# Patient Record
Sex: MALE | Race: BLACK OR AFRICAN AMERICAN | Employment: FULL TIME | URBAN - METROPOLITAN AREA
[De-identification: names, ages, dates, MRNs, and addresses within clinical notes are randomized per-mention and may not be internally consistent; named-entity substitution may affect disease eponyms.]

---

## 2017-01-12 ENCOUNTER — OFFICE VISIT (OUTPATIENT)
Dept: FAMILY MEDICINE CLINIC | Age: 57
End: 2017-01-12

## 2017-01-12 VITALS
TEMPERATURE: 97.9 F | DIASTOLIC BLOOD PRESSURE: 104 MMHG | SYSTOLIC BLOOD PRESSURE: 149 MMHG | HEART RATE: 86 BPM | WEIGHT: 192 LBS | BODY MASS INDEX: 30.13 KG/M2 | RESPIRATION RATE: 18 BRPM | OXYGEN SATURATION: 98 % | HEIGHT: 67 IN

## 2017-01-12 DIAGNOSIS — M54.42 CHRONIC BILATERAL LOW BACK PAIN WITH BILATERAL SCIATICA: Primary | ICD-10-CM

## 2017-01-12 DIAGNOSIS — N52.9 ERECTILE DYSFUNCTION, UNSPECIFIED ERECTILE DYSFUNCTION TYPE: ICD-10-CM

## 2017-01-12 DIAGNOSIS — M54.41 CHRONIC BILATERAL LOW BACK PAIN WITH BILATERAL SCIATICA: Primary | ICD-10-CM

## 2017-01-12 DIAGNOSIS — G89.29 CHRONIC NECK PAIN: ICD-10-CM

## 2017-01-12 DIAGNOSIS — G89.29 CHRONIC BILATERAL LOW BACK PAIN WITH BILATERAL SCIATICA: Primary | ICD-10-CM

## 2017-01-12 DIAGNOSIS — M54.2 CHRONIC NECK PAIN: ICD-10-CM

## 2017-01-12 RX ORDER — SILDENAFIL 50 MG/1
50 TABLET, FILM COATED ORAL AS NEEDED
Qty: 10 TAB | Refills: 0 | Status: SHIPPED | OUTPATIENT
Start: 2017-01-12

## 2017-01-12 RX ORDER — OXYCODONE AND ACETAMINOPHEN 5; 325 MG/1; MG/1
TABLET ORAL
Qty: 45 TAB | Refills: 0 | Status: SHIPPED | OUTPATIENT
Start: 2017-01-12 | End: 2017-02-10 | Stop reason: SDUPTHER

## 2017-01-12 NOTE — MR AVS SNAPSHOT
Visit Information Date & Time Provider Department Dept. Phone Encounter #  
 1/12/2017  3:30 PM Rani Peter, [address] [facility] [phone] [id number] Follow-up Instructions Return if symptoms worsen or fail to improve. Upcoming Health Maintenance Date Due DTaP/Tdap/Td series (1 - Tdap) 8/7/1983 INFLUENZA AGE 9 TO ADULT 8/1/2016 COLONOSCOPY 12/23/2025 Allergies as of 1/12/2017  Review Complete On: 1/12/2017 By: TAMMY Grant Severity Noted Reaction Type Reactions Codeine  05/09/2014    Nausea and Vomiting Current Immunizations  Never Reviewed No immunizations on file. Not reviewed this visit You Were Diagnosed With   
  
 Codes Comments Chronic bilateral low back pain with bilateral sciatica    -  Primary ICD-10-CM: M54.42, M54.41, G89.29 ICD-9-CM: 724.2, 724.3, 338.29 Chronic neck pain     ICD-10-CM: M54.2, G89.29 ICD-9-CM: 723.1, 338.29 Erectile dysfunction, unspecified erectile dysfunction type     ICD-10-CM: N52.9 ICD-9-CM: 607.84 Vitals BP Pulse Temp Resp Height(growth percentile) Weight(growth percentile) (!) 149/104 (BP 1 Location: Right arm, BP Patient Position: Sitting) 86 97.9 °F (36.6 °C) (Oral) 18 5' 7.01\" (1.702 m) 192 lb (87.1 kg) SpO2 BMI Smoking Status 98% 30.06 kg/m2 Former Smoker Vitals History BMI and BSA Data Body Mass Index Body Surface Area 30.06 kg/m 2 2.03 m 2 Preferred Pharmacy Pharmacy Name Phone [facility] [address]. [phone] Your Updated Medication List  
  
   
This list is accurate as of: 1/12/17  4:07 PM.  Always use your most recent med list.  
  
  
  
  
 cyclobenzaprine 10 mg tablet Commonly known as:  FLEXERIL Take 1 Tab by mouth nightly. oxyCODONE-acetaminophen 5-325 mg per tablet Commonly known as:  PERCOCET  
 Take one tab po Q4h/PRN  
  
 sildenafil citrate 50 mg tablet Commonly known as:  VIAGRA Take 1 Tab by mouth as needed. Prescriptions Printed Refills  
 oxyCODONE-acetaminophen (PERCOCET) 5-325 mg per tablet 0 Sig: Take one tab po Q4h/PRN Class: Print Prescriptions Sent to Pharmacy Refills  
 sildenafil citrate (VIAGRA) 50 mg tablet 0 Sig: Take 1 Tab by mouth as needed. Class: Normal  
 [facility] [address]  #: [phone] Route: Oral  
  
Follow-up Instructions Return if symptoms worsen or fail to improve. Patient Instructions Chronic Pain: Care Instructions Your Care Instructions Chronic pain is pain that lasts a long time (months or even years) and may or may not have a clear cause. It is different from acute pain, which usually does have a clear causelike an injury or illnessand gets better over time. Chronic pain: 
· Lasts over time but may vary from day to day. · Does not go away despite efforts to end it. · May disrupt your sleep and lead to fatigue. · May cause depression or anxiety. · May make your muscles tense, causing more pain. · Can disrupt your work, hobbies, home life, and relationships with friends and family. Chronic pain is a very real condition. It is not just in your head. Treatment can help and usually includes several methods used together, such as medicines, physical therapy, exercise, and other treatments. Learning how to relax and changing negative thought patterns can also help you cope. Chronic pain is complex. Taking an active role in your treatment will help you better manage your pain. Tell your doctor if you have trouble dealing with your pain. You may have to try several things before you find what works best for you. Follow-up care is a key part of your treatment and safety.  Be sure to make and go to all appointments, and call your doctor if you are having problems. Its also a good idea to know your test results and keep a list of the medicines you take. How can you care for yourself at home? · Pace yourself. Break up large jobs into smaller tasks. Save harder tasks for days when you have less pain, or go back and forth between hard tasks and easier ones. Take rest breaks. · Relax, and reduce stress. Relaxation techniques such as deep breathing or meditation can help. · Keep moving. Gentle, daily exercise can help reduce pain over the long run. Try low- or no-impact exercises such as walking, swimming, and stationary biking. Do stretches to stay flexible. · Try heat, cold packs, and massage. · Get enough sleep. Chronic pain can make you tired and drain your energy. Talk with your doctor if you have trouble sleeping because of pain. · Think positive. Your thoughts can affect your pain level. Do things that you enjoy to distract yourself when you have pain instead of focusing on the pain. See a movie, read a book, listen to music, or spend time with a friend. · If you think you are depressed, talk to your doctor about treatment. · Keep a daily pain diary. Record how your moods, thoughts, sleep patterns, activities, and medicine affect your pain. You may find that your pain is worse during or after certain activities or when you are feeling a certain emotion. Having a record of your pain can help you and your doctor find the best ways to treat your pain. · Take pain medicines exactly as directed. ¨ If the doctor gave you a prescription medicine for pain, take it as prescribed. ¨ If you are not taking a prescription pain medicine, ask your doctor if you can take an over-the-counter medicine. Reducing constipation caused by pain medicine · Include fruits, vegetables, beans, and whole grains in your diet each day. These foods are high in fiber. · Drink plenty of fluids, enough so that your urine is light yellow or clear like water. If you have kidney, heart, or liver disease and have to limit fluids, talk with your doctor before you increase the amount of fluids you drink. · If your doctor recommends it, get more exercise. Walking is a good choice. Bit by bit, increase the amount you walk every day. Try for at least 30 minutes on most days of the week. · Schedule time each day for a bowel movement. A daily routine may help. Take your time and do not strain when having a bowel movement. When should you call for help? Call your doctor now or seek immediate medical care if: 
· Your pain gets worse or is out of control. · You feel down or blue, or you do not enjoy things like you once did. You may be depressed, which is common in people with chronic pain. Depression can be treated. · You have vomiting or cramps for more than 2 hours. Watch closely for changes in your health, and be sure to contact your doctor if: 
· You cannot sleep because of pain. · You are very worried or anxious about your pain. · You have trouble taking your pain medicine. · You have any concerns about your pain medicine. · You have trouble with bowel movements, such as: 
¨ No bowel movement in 3 days. ¨ Blood in the anal area, in your stool, or on the toilet paper. ¨ Diarrhea for more than 24 hours. Where can you learn more? Go to http://nereyda-ladan.info/. Enter N004 in the search box to learn more about \"Chronic Pain: Care Instructions. \" Current as of: February 19, 2016 Content Version: 11.1 © 2602-4300 [employer]. Care instructions adapted under license by [employer] (which disclaims liability or warranty for this information).  If you have questions about a medical condition or this instruction, always ask your healthcare professional. [address] any warranty or liability for your use of this information. Introducing [facility]! Veronica Infante introduces The [employer] patient portal. Now you can access parts of your medical record, email your doctor's office, and request medication refills online. 1. In your internet browser, go to [email or web address] 2. Click on the First Time User? Click Here link in the Sign In box. You will see the New Member Sign Up page. 3. Enter your The [employer] Access Code exactly as it appears below. You will not need to use this code after youve completed the sign-up process. If you do not sign up before the expiration date, you must request a new code. · The [employer] Access Code: [email or web address] Expires: 4/12/2017  4:06 PM 
 
4. Enter the last four digits of your Social Security Number (xxxx) and Date of Birth (mm/dd/yyyy) as indicated and click Submit. You will be taken to the next sign-up page. 5. Create a The [employer]ima ID. This will be your The [employer] login ID and cannot be changed, so think of one that is secure and easy to remember. 6. Create a [employer] password. You can change your password at any time. 7. Enter your Password Reset Question and Answer. This can be used at a later time if you forget your password. 8. Enter your e-mail address. You will receive e-mail notification when new information is available in [address] 9. Click Sign Up. You can now view and download portions of your medical record. 10. Click the Download Summary menu link to download a portable copy of your medical information. If you have questions, please visit the Frequently Asked Questions section of the [employer] website. Remember, The [employer]ima is NOT to be used for urgent needs. For medical emergencies, dial 911. Now available from your iPhone and Android! Please provide this summary of care documentation to your next provider. Your primary care clinician is listed as [address].  If you have any questions after today's visit, please call [phone].

## 2017-01-12 NOTE — PROGRESS NOTES
1. Have you been to the ER, urgent care clinic since your last visit? Hospitalized since your last visit?no    2. Have you seen or consulted any other health care providers outside of the [facility] [place] [facility]"" since your last visit? Include any pap smears or colon screening. No    Patient Sheldon Marcano is a 49 y.o. male presents today for med refill.

## 2017-01-12 NOTE — PROGRESS NOTES
HISTORY OF PRESENT ILLNESS  José Miguel Sena is a 49 y.o. male. HPI Comments: Pt comes back today c/o chronic low back pain and neck pain. He says that he tried to go to Ortho yesterday for his appt with Dr Adan Sahni but they told him that he needs to see a different MD bc they put him with the wrong docotr and so they rescheduled his appt to Feb 2. He says that he is still in a lot of pain with no changes. He says that he would like a refill of Viagra. He denies fever, chills, sweats, N/V, chest pain, SOB, dizziness. Review of Systems   Constitutional: Negative for chills, diaphoresis, fever and malaise/fatigue. HENT: Negative for congestion, ear pain, hearing loss, nosebleeds and sore throat. Eyes: Negative for blurred vision, double vision, pain and redness. Respiratory: Negative for cough, hemoptysis, sputum production, shortness of breath and wheezing. Cardiovascular: Negative for chest pain, palpitations and leg swelling. Gastrointestinal: Negative for abdominal pain, blood in stool, constipation, diarrhea, nausea and vomiting. Genitourinary: Negative for dysuria and hematuria. Musculoskeletal: Positive for back pain and neck pain. Negative for myalgias. Skin: Negative for rash. Neurological: Negative for dizziness, tingling, sensory change, seizures, loss of consciousness and headaches. Endo/Heme/Allergies: Does not bruise/bleed easily. Psychiatric/Behavioral: Negative for depression, memory loss and substance abuse. The patient is not nervous/anxious.       Past Medical History   Diagnosis Date    Chronic pain      neck, back    DDD (degenerative disc disease)     Erectile dysfunction 5/9/2014    Stenosis        Family History   Problem Relation Age of Onset    COPD Mother    [address] [facility] Cancer Father      brain met to whole body    No Known Problems Sister     No Known Problems Sister        Past Surgical History   Procedure Laterality Date    Pr neurological procedure unlisted  1998 laser back surgery    Hx orthopaedic       Laser sx for Lower back for a bulging disc       Social History     Social History    Marital status:      Spouse name: N/A    Number of children: N/A    Years of education: N/A     Occupational History    Not on file. Social History Main Topics    Smoking status: Former Smoker    Smokeless tobacco: Never Used    Alcohol use No    Drug use: No    Sexual activity: Yes     Partners: Female     Birth control/ protection: None     Other Topics Concern     Service No    Blood Transfusions No    Caffeine Concern No    Occupational Exposure No    Hobby Hazards No    Sleep Concern No    Stress Concern No    Weight Concern No    Special Diet No    Back Care No    Exercise Yes    Bike Helmet No    Seat Belt Yes    Self-Exams Yes     Social History Narrative       Current Outpatient Prescriptions   Medication Sig Dispense Refill    oxyCODONE-acetaminophen (PERCOCET) 5-325 mg per tablet Take one tab po Q4h/PRN 45 Tab 0    sildenafil citrate (VIAGRA) 50 mg tablet Take 1 Tab by mouth as needed. 10 Tab 0    cyclobenzaprine (FLEXERIL) 10 mg tablet Take 1 Tab by mouth nightly. 30 Tab 1       Allergies   Allergen Reactions    Codeine Nausea and Vomiting       Visit Vitals    BP (!) 149/104 (BP 1 Location: Right arm, BP Patient Position: Sitting)    Pulse 86    Temp 97.9 °F (36.6 °C) (Oral)    Resp 18    Ht 5' 7.01\" (1.702 m)    Wt 192 lb (87.1 kg)    SpO2 98%    BMI 30.06 kg/m2       Physical Exam   Constitutional: He is oriented to person, place, and time. He appears well-developed and well-nourished. No distress. HENT:   Head: Normocephalic and atraumatic. Right Ear: External ear normal.   Left Ear: External ear normal.   Nose: Nose normal.   Mouth/Throat: Oropharynx is clear and moist. No oropharyngeal exudate. Eyes: Conjunctivae are normal. Pupils are equal, round, and reactive to light. Right eye exhibits no discharge. Left eye exhibits no discharge. No scleral icterus. Neck: Normal range of motion. Neck supple. No tracheal deviation present. No thyromegaly present. Cardiovascular: Normal rate, regular rhythm and normal heart sounds. Exam reveals no gallop and no friction rub. No murmur heard. Pulmonary/Chest: Effort normal and breath sounds normal. No respiratory distress. He has no wheezes. He has no rales. Abdominal: Soft. He exhibits no distension. There is no tenderness. Musculoskeletal: He exhibits tenderness. He exhibits no edema. Neck and Low back: TTP, pain with ROM   Lymphadenopathy:     He has no cervical adenopathy. Neurological: He is alert and oriented to person, place, and time. Coordination normal.   Skin: Skin is warm and dry. No rash noted. He is not diaphoretic. No erythema. Psychiatric: He has a normal mood and affect. His behavior is normal. Judgment and thought content normal.       ASSESSMENT and PLAN  1. Chronic bilateral low back pain with bilateral sciatica  - oxyCODONE-acetaminophen (PERCOCET) 5-325 mg per tablet; Take one tab po Q4h/PRN  Dispense: 45 Tab; Refill: 0  - told pt that I do not do chronic pain management and if he does not see Ortho I will no longer give him any pain meds and will do a PM referral instead; pt declines PM referral and understands that I will not refill Percocet again until he sees Ortho  - NO MORE REFILLS OF PERCOCET UNTIL SEEN BY ORTHO    2. Chronic neck pain  - oxyCODONE-acetaminophen (PERCOCET) 5-325 mg per tablet; Take one tab po Q4h/PRN  Dispense: 45 Tab; Refill: 0    3. Erectile dysfunction, unspecified erectile dysfunction type  - sildenafil citrate (VIAGRA) 50 mg tablet; Take 1 Tab by mouth as needed. Dispense: 10 Tab; Refill: 0    - follow up as needed    Plan and reference materials were reviewed with the patient and the patient expressed understanding.   Patient instructed that if symptoms/condition worsens or fails to resolve to come back to the office or go to the emergency room.     Saulo Boo, [phone]

## 2017-02-10 ENCOUNTER — OFFICE VISIT (OUTPATIENT)
Dept: FAMILY MEDICINE CLINIC | Age: 57
End: 2017-02-10

## 2017-02-10 VITALS
OXYGEN SATURATION: 97 % | DIASTOLIC BLOOD PRESSURE: 100 MMHG | RESPIRATION RATE: 17 BRPM | WEIGHT: 196 LBS | HEIGHT: 67 IN | BODY MASS INDEX: 30.76 KG/M2 | TEMPERATURE: 96.8 F | SYSTOLIC BLOOD PRESSURE: 140 MMHG | HEART RATE: 97 BPM

## 2017-02-10 DIAGNOSIS — M54.42 CHRONIC BILATERAL LOW BACK PAIN WITH BILATERAL SCIATICA: Primary | ICD-10-CM

## 2017-02-10 DIAGNOSIS — G89.29 CHRONIC NECK PAIN: ICD-10-CM

## 2017-02-10 DIAGNOSIS — M54.41 CHRONIC BILATERAL LOW BACK PAIN WITH BILATERAL SCIATICA: Primary | ICD-10-CM

## 2017-02-10 DIAGNOSIS — G89.29 CHRONIC BILATERAL LOW BACK PAIN WITH BILATERAL SCIATICA: Primary | ICD-10-CM

## 2017-02-10 DIAGNOSIS — M54.2 CHRONIC NECK PAIN: ICD-10-CM

## 2017-02-10 RX ORDER — OXYCODONE AND ACETAMINOPHEN 5; 325 MG/1; MG/1
TABLET ORAL
Qty: 45 TAB | Refills: 0 | Status: SHIPPED | OUTPATIENT
Start: 2017-02-10 | End: 2018-08-18 | Stop reason: SDUPTHER

## 2017-02-10 NOTE — PROGRESS NOTES
Robyn Monroy is a 49 y.o. male presents to office for medication refill and reaction to tramadol. 1. Have you been to the ER, urgent care clinic or hospitalized since your last visit? no  2. [facility] since your last visit? ortho  3.  Have you had a Flu shot this year? no       Health Maintenance items with a due date reviewed with patient:  Health Maintenance Due   Topic Date Due    DTaP/Tdap/Td series (1 - Tdap) 08/07/1983    INFLUENZA AGE 9 TO ADULT  08/01/2016

## 2017-02-10 NOTE — PROGRESS NOTES
HISTORY OF PRESENT ILLNESS  José Miguel Wilder is a 49 y.o. male. HPI Comments: Pt comes in today for f/u chronic back pain. He says that he did see Ortho and he got a CT scan and they made him take 2-3 weeks off of work and he is scheduled for an MRI next week. He says that they gave him Tramadol but he took 1 tab and it made him very itchy all over. He brought the bottle of Tramadol with him to the appt today. He says that he would like the Percocet instead but Ortho said they would not give this to him and to return to PCP. He denies fever, chills, sweats, N/V, chest pain, SOB, dizziness. Medication Refill   Pertinent negatives include no chest pain, no abdominal pain, no headaches and no shortness of breath. Medication Reaction   Pertinent negatives include no chest pain, no abdominal pain, no headaches and no shortness of breath. Review of Systems   Constitutional: Negative for chills, diaphoresis, fever and malaise/fatigue. HENT: Negative for congestion, ear pain, hearing loss, nosebleeds and sore throat. Eyes: Negative for blurred vision, double vision, pain and redness. Respiratory: Negative for cough, hemoptysis, sputum production, shortness of breath and wheezing. Cardiovascular: Negative for chest pain, palpitations and leg swelling. Gastrointestinal: Negative for abdominal pain, blood in stool, constipation, diarrhea, nausea and vomiting. Genitourinary: Negative for dysuria and hematuria. Musculoskeletal: Positive for back pain. Negative for myalgias and neck pain. Skin: Negative for rash. Neurological: Negative for dizziness, tingling, sensory change, seizures, loss of consciousness and headaches. Endo/Heme/Allergies: Does not bruise/bleed easily. Psychiatric/Behavioral: Negative for depression, memory loss and substance abuse. The patient is not nervous/anxious.       Past Medical History   Diagnosis Date    Chronic pain      neck, back    DDD (degenerative disc disease)     Erectile dysfunction 5/9/2014    Stenosis        Family History   Problem Relation Age of Onset    COPD Mother    [facility] Cancer Father      brain met to whole body    No Known Problems Sister     No Known Problems Sister        Past Surgical History   Procedure Laterality Date    Pr neurological procedure unlisted  1998     laser back surgery    Hx orthopaedic       Laser sx for Lower back for a bulging disc       Social History     Social History    Marital status:      Spouse name: N/A    Number of children: N/A    Years of education: N/A     Occupational History    Not on file. Social History Main Topics    Smoking status: Former Smoker    Smokeless tobacco: Never Used    Alcohol use No    Drug use: No    Sexual activity: Yes     Partners: Female     Birth control/ protection: None     Other Topics Concern     Service No    Blood Transfusions No    Caffeine Concern No    Occupational Exposure No    Hobby Hazards No    Sleep Concern No    Stress Concern No    Weight Concern No    Special Diet No    Back Care No    Exercise Yes    Bike Helmet No    Seat Belt Yes    Self-Exams Yes     Social History Narrative       Current Outpatient Prescriptions   Medication Sig Dispense Refill    oxyCODONE-acetaminophen (PERCOCET) 5-325 mg per tablet Take one tab po Q4h/PRN 45 Tab 0    sildenafil citrate (VIAGRA) 50 mg tablet Take 1 Tab by mouth as needed. 10 Tab 0    cyclobenzaprine (FLEXERIL) 10 mg tablet Take 1 Tab by mouth nightly. 30 Tab 1       Allergies   Allergen Reactions    Codeine Nausea and Vomiting       Visit Vitals    BP (!) 140/100 (BP 1 Location: Right arm, BP Patient Position: Sitting)    Pulse 97    Temp 96.8 °F (36 °C) (Oral)    Resp 17    Ht 5' 7\" (1.702 m)    Wt 196 lb (88.9 kg)    SpO2 97%    BMI 30.7 kg/m2       Physical Exam   Constitutional: He is oriented to person, place, and time. He appears well-developed and well-nourished.  No distress. HENT:   Head: Normocephalic and atraumatic. Right Ear: External ear normal.   Left Ear: External ear normal.   Nose: Nose normal.   Mouth/Throat: Oropharynx is clear and moist. No oropharyngeal exudate. Eyes: Conjunctivae are normal. Pupils are equal, round, and reactive to light. Right eye exhibits no discharge. Left eye exhibits no discharge. No scleral icterus. Neck: Normal range of motion. Neck supple. No tracheal deviation present. No thyromegaly present. Cardiovascular: Normal rate, regular rhythm and normal heart sounds. Exam reveals no gallop and no friction rub. No murmur heard. Pulmonary/Chest: Effort normal and breath sounds normal. No respiratory distress. He has no wheezes. He has no rales. Abdominal: Soft. He exhibits no distension. There is no tenderness. Musculoskeletal: He exhibits tenderness. He exhibits no edema. Lymphadenopathy:     He has no cervical adenopathy. Neurological: He is alert and oriented to person, place, and time. Coordination normal.   Skin: Skin is warm and dry. No rash noted. He is not diaphoretic. No erythema. Psychiatric: He has a normal mood and affect. His behavior is normal. Judgment and thought content normal.       ASSESSMENT and PLAN  1. Chronic bilateral low back pain with bilateral sciatica  - oxyCODONE-acetaminophen (PERCOCET) 5-325 mg per tablet; Take one tab po Q4h/PRN  Dispense: 45 Tab; Refill: 0    2. Chronic neck pain  - oxyCODONE-acetaminophen (PERCOCET) 5-325 mg per tablet; Take one tab po Q4h/PRN  Dispense: 45 Tab; Refill: 0    - follow up as needed, followed by Ortho    Plan and reference materials were reviewed with the patient and the patient expressed understanding. Patient instructed that if symptoms/condition worsens or fails to resolve to come back to the office or go to the emergency room.     Nancy Scott

## 2017-02-10 NOTE — PATIENT INSTRUCTIONS
MRI of the Lumbar Spine: About This Test  What is it? MRI (magnetic resonance imaging) is a test that uses a magnetic field and pulses of radio wave energy to make pictures of the organs and structures inside the body. An MRI can give your doctor information about the spine in your lower back (the lumbar spine). This can include the spine, the space around the spinal cord, and the vertebrae in your lower back. When you have an MRI, you lie on a table that moves into the MRI machine. Why is this test done? An MRI of the lumbar spine can help find the cause of symptoms like back pain or leg pain, weakness, or numbness. It can help find problems such as a herniated disc, a tumor, or an infection. How can you prepare for the test?  Talk to your doctor about all your health conditions before the test. For example, tell your doctor if:  · You are allergic to any medicines. · You are or might be pregnant. · You have a pacemaker, an artificial limb, any metal pins or metal parts in your body, metal heart valves, metal clips in your brain, metal implants in your ears, or any other implanted or prosthetic medical device. · You have an intrauterine device (IUD) in place. · You get nervous in confined spaces. You may need medicine to help you relax. · You wear a patch that contains medicine. · You have kidney disease. What happens before the test?  · You will remove all metal objects, such as hearing aids, dentures, jewelry, watches, and hairpins. · You will take off all or most of your clothes and change into a gown. · You may have contrast material (dye) put into your arm through a tube called an IV. Contrast material helps doctors see specific organs, blood vessels, and most tumors. What happens during the test?  · You will lie on your back on a table that is part of the MRI scanner. Your head, chest, and arms may be held with straps to help you stay still.   · The table will slide into the space that contains the magnet. · Inside the scanner you will hear a fan and feel air moving. You may hear tapping, thumping, or snapping noises. You may be given earplugs or headphones to reduce the noise. · You will be asked to hold still during the scan. You may be asked to hold your breath for short periods. · You may be alone in the scanning room, but a technologist will watch you through a window and talk with you during the test.  What else should you know about the test?  · An MRI does not hurt. · If a dye is used, you may feel a quick sting or pinch and some coolness when the IV is started. Some people feel sick to their stomach or get a headache. · If you breastfeed and are concerned about whether the dye used in this test is safe, talk to your doctor. Most experts believe that very little dye passes into breast milk and even less is passed on to the baby. But if you prefer, you can store some of your breast milk ahead of time and use it for a day or two after the test.  · You may feel warmth in the area being examined. This is normal.  How long does the test take? · The test usually takes 30 to 60 minutes. What happens after the test?  · You will probably be able to go home right away, depending on the reason for the test.  · You can go back to your usual activities right away. Follow-up care is a key part of your treatment and safety. Be sure to make and go to all appointments, and call your doctor if you are having problems. It's also a good idea to keep a list of the medicines you take. Ask your doctor when you can expect to have your test results. Where can you learn more? Go to http://nereyda-ladan.info/. Enter A724 in the search box to learn more about \"MRI of the Lumbar Spine: About This Test.\"  Current as of: February 19, 2016  Content Version: 11.1  © 8474-3266 [employer].  Care instructions adapted under license by [employer] (which disclaims liability or warranty for this information). If you have questions about a medical condition or this instruction, always ask your healthcare professional. [place] [address] any warranty or liability for your use of this information.

## 2017-02-10 NOTE — MR AVS SNAPSHOT
Visit Information Date & Time Provider Department Dept. Phone Encounter #  
 2/10/2017  3:00 PM Tammy Partida, [address] [facility] [phone] [id number] Follow-up Instructions Return if symptoms worsen or fail to improve. Upcoming Health Maintenance Date Due DTaP/Tdap/Td series (1 - Tdap) 8/7/1983 INFLUENZA AGE 9 TO ADULT 8/1/2016 COLONOSCOPY 12/23/2025 Allergies as of 2/10/2017  Review Complete On: 2/10/2017 By: TAMMY Hoffmann Severity Noted Reaction Type Reactions Codeine  05/09/2014    Nausea and Vomiting Current Immunizations  Never Reviewed No immunizations on file. Not reviewed this visit You Were Diagnosed With   
  
 Codes Comments Chronic bilateral low back pain with bilateral sciatica    -  Primary ICD-10-CM: M54.42, M54.41, G89.29 ICD-9-CM: 724.2, 724.3, 338.29 Chronic neck pain     ICD-10-CM: M54.2, G89.29 ICD-9-CM: 723.1, 338.29 Vitals BP Pulse Temp Resp Height(growth percentile) Weight(growth percentile) (!) 140/100 (BP 1 Location: Right arm, BP Patient Position: Sitting) 97 96.8 °F (36 °C) (Oral) 17 5' 7\" (1.702 m) 196 lb (88.9 kg) SpO2 BMI Smoking Status 97% 30.7 kg/m2 Former Smoker BMI and BSA Data Body Mass Index Body Surface Area 30.7 kg/m 2 2.05 m 2 Preferred Pharmacy Pharmacy Name Phone [facility] [address] 16. [phone] Your Updated Medication List  
  
   
This list is accurate as of: 2/10/17  3:34 PM.  Always use your most recent med list.  
  
  
  
  
 cyclobenzaprine 10 mg tablet Commonly known as:  FLEXERIL Take 1 Tab by mouth nightly. oxyCODONE-acetaminophen 5-325 mg per tablet Commonly known as:  PERCOCET Take one tab po Q4h/PRN  
  
 sildenafil citrate 50 mg tablet Commonly known as:  VIAGRA Take 1 Tab by mouth as needed. Prescriptions Printed Refills  
 oxyCODONE-acetaminophen (PERCOCET) 5-325 mg per tablet 0 Sig: Take one tab po Q4h/PRN Class: Print Follow-up Instructions Return if symptoms worsen or fail to improve. Patient Instructions MRI of the Lumbar Spine: About This Test 
What is it? MRI (magnetic resonance imaging) is a test that uses a magnetic field and pulses of radio wave energy to make pictures of the organs and structures inside the body. An MRI can give your doctor information about the spine in your lower back (the lumbar spine). This can include the spine, the space around the spinal cord, and the vertebrae in your lower back. When you have an MRI, you lie on a table that moves into the MRI machine. Why is this test done? An MRI of the lumbar spine can help find the cause of symptoms like back pain or leg pain, weakness, or numbness. It can help find problems such as a herniated disc, a tumor, or an infection. How can you prepare for the test? 
Talk to your doctor about all your health conditions before the test. For example, tell your doctor if: 
· You are allergic to any medicines. · You are or might be pregnant. · You have a pacemaker, an artificial limb, any metal pins or metal parts in your body, metal heart valves, metal clips in your brain, metal implants in your ears, or any other implanted or prosthetic medical device. · You have an intrauterine device (IUD) in place. · You get nervous in confined spaces. You may need medicine to help you relax. · You wear a patch that contains medicine. · You have kidney disease. What happens before the test? 
· You will remove all metal objects, such as hearing aids, dentures, jewelry, watches, and hairpins. · You will take off all or most of your clothes and change into a gown.  
· You may have contrast material (dye) put into your arm through a tube called an IV. Contrast material helps doctors see specific organs, blood vessels, and most tumors. What happens during the test? 
· You will lie on your back on a table that is part of the MRI scanner. Your head, chest, and arms may be held with straps to help you stay still. · The table will slide into the space that contains the magnet. · Inside the scanner you will hear a fan and feel air moving. You may hear tapping, thumping, or snapping noises. You may be given earplugs or headphones to reduce the noise. · You will be asked to hold still during the scan. You may be asked to hold your breath for short periods. · You may be alone in the scanning room, but a technologist will watch you through a window and talk with you during the test. 
What else should you know about the test? 
· An MRI does not hurt. · If a dye is used, you may feel a quick sting or pinch and some coolness when the IV is started. Some people feel sick to their stomach or get a headache. · If you breastfeed and are concerned about whether the dye used in this test is safe, talk to your doctor. Most experts believe that very little dye passes into breast milk and even less is passed on to the baby. But if you prefer, you can store some of your breast milk ahead of time and use it for a day or two after the test. 
· You may feel warmth in the area being examined. This is normal. 
How long does the test take? · The test usually takes 30 to 60 minutes. What happens after the test? 
· You will probably be able to go home right away, depending on the reason for the test. 
· You can go back to your usual activities right away. Follow-up care is a key part of your treatment and safety. Be sure to make and go to all appointments, and call your doctor if you are having problems. It's also a good idea to keep a list of the medicines you take. Ask your doctor when you can expect to have your test results. Where can you learn more? Go to http://nereyda-ladan.info/. Enter A325 in the search box to learn more about \"MRI of the Lumbar Spine: About This Test.\" Current as of: February 19, 2016 Content Version: 11.1 © 2774-6074 [employer]. Care instructions adapted under license by [employer] (which disclaims liability or warranty for this information). If you have questions about a medical condition or this instruction, always ask your healthcare professional. [address] any warranty or liability for your use of this information. Introducing [address]! [facility] introduces Tappx patient portal. Now you can access parts of your medical record, email your doctor's office, and request medication refills online. 1. In your internet browser, go to [email or web address] 2. Click on the First Time User? Click Here link in the Sign In box. You will see the New Member Sign Up page. 3. Enter your Tappx Access Code exactly as it appears below. You will not need to use this code after youve completed the sign-up process. If you do not sign up before the expiration date, you must request a new code. · Tappx Access Code: [email or web address] Expires: 4/12/2017  4:06 PM 
 
4. Enter the last four digits of your Social Security Number (xxxx) and Date of Birth (mm/dd/yyyy) as indicated and click Submit. You will be taken to the next sign-up page. 5. Create a Tappx ID. This will be your Tappx login ID and cannot be changed, so think of one that is secure and easy to remember. 6. Create a Tappx password. You can change your password at any time. 7. Enter your Password Reset Question and Answer. This can be used at a later time if you forget your password. 8. Enter your e-mail address. You will receive e-mail notification when new information is available in [address] 9. Click Sign Up.  You can now view and download portions of your medical record. 10. Click the Download Summary menu link to download a portable copy of your medical information. If you have questions, please visit the Frequently Asked Questions section of the Shizzlr website. Remember, Shizzlr is NOT to be used for urgent needs. For medical emergencies, dial 911. Now available from your iPhone and Android! Please provide this summary of care documentation to your next provider. Your primary care clinician is listed as Mina Fernandes. If you have any questions after today's visit, please call [phone].

## 2017-03-11 ENCOUNTER — OFFICE VISIT (OUTPATIENT)
Dept: FAMILY MEDICINE CLINIC | Age: 57
End: 2017-03-11

## 2017-03-11 VITALS
SYSTOLIC BLOOD PRESSURE: 130 MMHG | HEIGHT: 67 IN | RESPIRATION RATE: 16 BRPM | OXYGEN SATURATION: 97 % | WEIGHT: 191 LBS | DIASTOLIC BLOOD PRESSURE: 86 MMHG | BODY MASS INDEX: 29.98 KG/M2 | HEART RATE: 75 BPM | TEMPERATURE: 96.4 F

## 2017-03-11 DIAGNOSIS — G89.29 CHRONIC NECK PAIN: Primary | ICD-10-CM

## 2017-03-11 DIAGNOSIS — G89.29 CHRONIC BILATERAL LOW BACK PAIN WITH BILATERAL SCIATICA: ICD-10-CM

## 2017-03-11 DIAGNOSIS — M54.41 CHRONIC BILATERAL LOW BACK PAIN WITH BILATERAL SCIATICA: ICD-10-CM

## 2017-03-11 DIAGNOSIS — M54.2 CHRONIC NECK PAIN: Primary | ICD-10-CM

## 2017-03-11 DIAGNOSIS — M54.42 CHRONIC BILATERAL LOW BACK PAIN WITH BILATERAL SCIATICA: ICD-10-CM

## 2017-03-11 NOTE — PROGRESS NOTES
Serena Edwards is a 49 y.o. male here today for back pain due to fall.  Patient stated that [facility] and was given medication and referred to PT

## 2017-03-11 NOTE — PROGRESS NOTES
HISTORY OF PRESENT ILLNESS  José Miguel Miner is a 49 y.o. male. HPI Comments: Pt comes in today c/o low back pain and neck pain. He says that he was at work and fell on a rock. He says that his work sent him to [facility] and they gave him Flexeril and Norco. He says that the [address] does not work and makes his stomach upset. He denies fever, chills, sweats, N/V, chest pain, SOB, dizziness. Back Pain    Pertinent negatives include no chest pain, no fever, no headaches, no abdominal pain, no dysuria and no tingling. Review of Systems   Constitutional: Negative for chills, diaphoresis, fever and malaise/fatigue. HENT: Negative for congestion, ear pain, hearing loss, nosebleeds and sore throat. Eyes: Negative for blurred vision, double vision, pain and redness. Respiratory: Negative for cough, hemoptysis, sputum production, shortness of breath and wheezing. Cardiovascular: Negative for chest pain, palpitations and leg swelling. Gastrointestinal: Negative for abdominal pain, blood in stool, constipation, diarrhea, nausea and vomiting. Genitourinary: Negative for dysuria and hematuria. Musculoskeletal: Positive for back pain and neck pain. Negative for myalgias. Skin: Negative for rash. Neurological: Negative for dizziness, tingling, sensory change, seizures, loss of consciousness and headaches. Endo/Heme/Allergies: Does not bruise/bleed easily. Psychiatric/Behavioral: Negative for depression, memory loss and substance abuse. The patient is not nervous/anxious.       Past Medical History:   Diagnosis Date    Chronic pain     neck, back    DDD (degenerative disc disease)     Erectile dysfunction 5/9/2014    Stenosis        Family History   Problem Relation Age of Onset    COPD Mother    [address] [facility] Mario Cancer Father      brain met to whole body    No Known Problems Sister     No Known Problems Sister        Past Surgical History:   Procedure Laterality Date    HX ORTHOPAEDIC      Laser sx for Lower back for a bulging disc    NEUROLOGICAL PROCEDURE UNLISTED  1998    laser back surgery       Social History     Social History    Marital status:      Spouse name: N/A    Number of children: N/A    Years of education: N/A     Occupational History    Not on file. Social History Main Topics    Smoking status: Former Smoker    Smokeless tobacco: Never Used    Alcohol use No    Drug use: No    Sexual activity: Yes     Partners: Female     Birth control/ protection: None     Other Topics Concern     Service No    Blood Transfusions No    Caffeine Concern No    Occupational Exposure No    Hobby Hazards No    Sleep Concern No    Stress Concern No    Weight Concern No    Special Diet No    Back Care No    Exercise Yes    Bike Helmet No    Seat Belt Yes    Self-Exams Yes     Social History Narrative       Current Outpatient Prescriptions   Medication Sig Dispense Refill    sildenafil citrate (VIAGRA) 50 mg tablet Take 1 Tab by mouth as needed. 10 Tab 0    cyclobenzaprine (FLEXERIL) 10 mg tablet Take 1 Tab by mouth nightly. 30 Tab 1    oxyCODONE-acetaminophen (PERCOCET) 5-325 mg per tablet Take one tab po Q4h/PRN 45 Tab 0       Allergies   Allergen Reactions    Codeine Nausea and Vomiting    Vicodin [Hydrocodone-Acetaminophen] Nausea Only       Visit Vitals    /86 (BP 1 Location: Right arm, BP Patient Position: Sitting)    Pulse 75    Temp 96.4 °F (35.8 °C) (Oral)    Resp 16    Ht 5' 7\" (1.702 m)    Wt 191 lb (86.6 kg)    SpO2 97%    BMI 29.91 kg/m2       Physical Exam   Constitutional: He is oriented to person, place, and time. He appears well-developed and well-nourished. No distress. HENT:   Head: Normocephalic and atraumatic. Right Ear: External ear normal.   Left Ear: External ear normal.   Nose: Nose normal.   Mouth/Throat: Oropharynx is clear and moist. No oropharyngeal exudate.    Eyes: Conjunctivae are normal. Pupils are equal, round, and reactive to light. Right eye exhibits no discharge. Left eye exhibits no discharge. No scleral icterus. Neck: Normal range of motion. Neck supple. No tracheal deviation present. No thyromegaly present. Cardiovascular: Normal rate, regular rhythm and normal heart sounds. Exam reveals no gallop and no friction rub. No murmur heard. Pulmonary/Chest: Effort normal and breath sounds normal. No respiratory distress. He has no wheezes. He has no rales. Abdominal: Soft. Bowel sounds are normal. He exhibits no distension. There is no tenderness. There is no rebound and no guarding. Musculoskeletal: He exhibits edema and tenderness. Neck and back: TTP, pain with ROM   Lymphadenopathy:     He has no cervical adenopathy. Neurological: He is alert and oriented to person, place, and time. Coordination normal.   Skin: Skin is warm and dry. No rash noted. He is not diaphoretic. No erythema. Psychiatric: He has a normal mood and affect. His behavior is normal. Judgment and thought content normal.       ASSESSMENT and PLAN  1. Chronic neck pain  - told pt I would not give pain meds and I could not interfere with workman's comp and he needs to follow up with Ortho; pt expressed understanding    2. Chronic bilateral low back pain with bilateral sciatica    Plan and reference materials were reviewed with the patient and the patient expressed understanding. Patient instructed that if symptoms/condition worsens or fails to resolve to come back to the office or go to the emergency room.     Nancy De La Rosa

## 2017-03-11 NOTE — MR AVS SNAPSHOT
Visit Information Date & Time Provider Department Dept. Phone Encounter #  
 3/11/2017  3:30 PM Nallely Friday, [address] [facility] [phone] [id number] Follow-up Instructions Return if symptoms worsen or fail to improve. Upcoming Health Maintenance Date Due DTaP/Tdap/Td series (1 - Tdap) 8/7/1983 INFLUENZA AGE 9 TO ADULT 8/1/2016 COLONOSCOPY 12/23/2025 Allergies as of 3/11/2017  Review Complete On: 3/11/2017 By: Yobani Becerril LPN Severity Noted Reaction Type Reactions Codeine  05/09/2014    Nausea and Vomiting Vicodin [Hydrocodone-acetaminophen]  03/11/2017    Nausea Only Current Immunizations  Never Reviewed No immunizations on file. Not reviewed this visit You Were Diagnosed With   
  
 Codes Comments Chronic neck pain    -  Primary ICD-10-CM: M54.2, G89.29 ICD-9-CM: 723.1, 338.29 Chronic bilateral low back pain with bilateral sciatica     ICD-10-CM: M54.42, M54.41, G89.29 ICD-9-CM: 724.2, 724.3, 338.29 Vitals BP Pulse Temp Resp Height(growth percentile) Weight(growth percentile) 130/86 (BP 1 Location: Right arm, BP Patient Position: Sitting) 75 96.4 °F (35.8 °C) (Oral) 16 5' 7\" (1.702 m) 191 lb (86.6 kg) SpO2 BMI Smoking Status 97% 29.91 kg/m2 Former Smoker Vitals History BMI and BSA Data Body Mass Index Body Surface Area  
 29.91 kg/m 2 2.02 m 2 Preferred Pharmacy Pharmacy Name Phone [facility] [address]. [phone] Your Updated Medication List  
  
   
This list is accurate as of: 3/11/17  3:51 PM.  Always use your most recent med list.  
  
  
  
  
 cyclobenzaprine 10 mg tablet Commonly known as:  FLEXERIL Take 1 Tab by mouth nightly. oxyCODONE-acetaminophen 5-325 mg per tablet Commonly known as:  PERCOCET Take one tab po Q4h/PRN  
  
 sildenafil citrate 50 mg tablet Commonly known as:  VIAGRA Take 1 Tab by mouth as needed. Follow-up Instructions Return if symptoms worsen or fail to improve. Patient Instructions Learning About How to Have a Healthy Back What causes back pain? Back pain is often caused by overuse, strain, or injury. For example, people often hurt their backs playing sports or working in the yard, being jolted in a car accident, or lifting something too heavy. Aging plays a part too. Your bones and muscles tend to lose strength as you age, which makes injury more likely. The spongy discs between the bones of the spine (vertebrae) may suffer from wear and tear and no longer provide enough cushion between the bones. A disc that bulges or breaks open (herniated disc) can press on nerves, causing back pain. In some people, back pain is the result of arthritis, broken vertebrae caused by bone loss (osteoporosis), illness, or a spine problem. Although most people have back pain at one time or another, there are steps you can take to make it less likely. How can you have a healthy back? Reduce stress on your back through good posture Slumping or slouching alone may not cause low back pain. But after the back has been strained or injured, bad posture can make pain worse. · Sleep in a position that maintains your back's normal curves and on a mattress that feels comfortable. Sleep on your side with a pillow between your knees, or sleep on your back with a pillow under your knees. These positions can reduce strain on your back. · Stand and sit up straight. \"Good posture\" generally means your ears, shoulders, and hips are in a straight line. · If you must stand for a long time, put one foot on a stool, ledge, or box. Switch feet every now and then.  
· Sit in a chair that is low enough to let you place both feet flat on the floor with both knees nearly level with your hips. If your chair or desk is too high, use a footrest to raise your knees. Place a small pillow, a rolled-up towel, or a lumbar roll in the curve of your back if you need extra support. · Try a kneeling chair, which helps tilt your hips forward. This takes pressure off your lower back. · Try sitting on an exercise ball. It can rock from side to side, which helps keep your back loose. · When driving, keep your knees nearly level with your hips. Sit straight, and drive with both hands on the steering wheel. Your arms should be in a slightly bent position. Reduce stress on your back through careful lifting · Squat down, bending at the hips and knees only. If you need to, put one knee to the floor and extend your other knee in front of you, bent at a right angle (half kneeling). · Press your chest straight forward. This helps keep your upper back straight while keeping a slight arch in your low back. · Hold the load as close to your body as possible, at the level of your belly button (navel). · Use your feet to change direction, taking small steps. · Lead with your hips as you change direction. Keep your shoulders in line with your hips as you move. · Set down your load carefully, squatting with your knees and hips only. Exercise and stretch your back · Do some exercise on most days of the week, if your doctor says it is okay. You can walk, run, swim, or cycle. · Stretch your back muscles. Here are a few exercises to try: ¨ Lie on your back, and gently pull one bent knee to your chest. Put that foot back on the floor, and then pull the other knee to your chest. 
¨ Do pelvic tilts. Lie on your back with your knees bent. Tighten your stomach muscles. Pull your belly button (navel) in and up toward your ribs. You should feel like your back is pressing to the floor and your hips and pelvis are slightly lifting off the floor.  Hold for 6 seconds while breathing smoothly. ¨ Sit with your back flat against a wall. · Keep your core muscles strong. The muscles of your back, belly (abdomen), and buttocks support your spine. ¨ Pull in your belly and imagine pulling your navel toward your spine. Hold this for 6 seconds, then relax. Remember to keep breathing normally as you tense your muscles. ¨ Do curl-ups. Always do them with your knees bent. Keep your low back on the floor, and curl your shoulders toward your knees using a smooth, slow motion. Keep your arms folded across your chest. If this bothers your neck, try putting your hands behind your neck (not your head), with your elbows spread apart. ¨ Lie on your back with your knees bent and your feet flat on the floor. Tighten your belly muscles, and then push with your feet and raise your buttocks up a few inches. Hold this position 6 seconds as you continue to breathe normally, then lower yourself slowly to the floor. Repeat 8 to 12 times. ¨ If you like group exercise, try Pilates or yoga. These classes have poses that strengthen the core muscles. Lead a healthy lifestyle · Stay at a healthy weight to avoid strain on your back. · Do not smoke. Smoking increases the risk of osteoporosis, which weakens the spine. If you need help quitting, talk to your doctor about stop-smoking programs and medicines. These can increase your chances of quitting for good. Where can you learn more? Go to http://nereyda-ladan.info/. Enter L315 in the search box to learn more about \"Learning About How to Have a Healthy Back. \" Current as of: May 23, 2016 Content Version: 11.1 © 5533-8877 [employer], Incorporated. Care instructions adapted under license by [employer] (which disclaims liability or warranty for this information).  If you have questions about a medical condition or this instruction, always ask your healthcare professional. [employer] disclaims any warranty or liability for your use of this information. Introducing [facility]! [employer] introduces ralali patient portal. Now you can access parts of your medical record, email your doctor's office, and request medication refills online. 1. In your internet browser, go to [email or web address] 2. Click on the First Time User? Click Here link in the Sign In box. You will see the New Member Sign Up page. 3. Enter your ralali Access Code exactly as it appears below. You will not need to use this code after youve completed the sign-up process. If you do not sign up before the expiration date, you must request a new code. · ralali Access Code: [email or web address] Expires: 4/12/2017  4:06 PM 
 
4. Enter the last four digits of your Social Security Number (xxxx) and Date of Birth (mm/dd/yyyy) as indicated and click Submit. You will be taken to the next sign-up page. 5. Create a ralali ID. This will be your ralali login ID and cannot be changed, so think of one that is secure and easy to remember. 6. Create a ralali password. You can change your password at any time. 7. Enter your Password Reset Question and Answer. This can be used at a later time if you forget your password. 8. Enter your e-mail address. You will receive e-mail notification when new information is available in [address] 9. Click Sign Up. You can now view and download portions of your medical record. 10. Click the Download Summary menu link to download a portable copy of your medical information. If you have questions, please visit the Frequently Asked Questions section of the ralali website. Remember, ralali is NOT to be used for urgent needs. For medical emergencies, dial 911. Now available from your iPhone and Android! Please provide this summary of care documentation to your next provider. Your primary care clinician is listed as Mina Fernandes. If you have any questions after today's visit, please call [phone].

## 2017-03-11 NOTE — PATIENT INSTRUCTIONS
Learning About How to Have a Healthy Back  What causes back pain? Back pain is often caused by overuse, strain, or injury. For example, people often hurt their backs playing sports or working in the yard, being jolted in a car accident, or lifting something too heavy. Aging plays a part too. Your bones and muscles tend to lose strength as you age, which makes injury more likely. The spongy discs between the bones of the spine (vertebrae) may suffer from wear and tear and no longer provide enough cushion between the bones. A disc that bulges or breaks open (herniated disc) can press on nerves, causing back pain. In some people, back pain is the result of arthritis, broken vertebrae caused by bone loss (osteoporosis), illness, or a spine problem. Although most people have back pain at one time or another, there are steps you can take to make it less likely. How can you have a healthy back? Reduce stress on your back through good posture  Slumping or slouching alone may not cause low back pain. But after the back has been strained or injured, bad posture can make pain worse. · Sleep in a position that maintains your back's normal curves and on a mattress that feels comfortable. Sleep on your side with a pillow between your knees, or sleep on your back with a pillow under your knees. These positions can reduce strain on your back. · Stand and sit up straight. \"Good posture\" generally means your ears, shoulders, and hips are in a straight line. · If you must stand for a long time, put one foot on a stool, ledge, or box. Switch feet every now and then. · Sit in a chair that is low enough to let you place both feet flat on the floor with both knees nearly level with your hips. If your chair or desk is too high, use a footrest to raise your knees. Place a small pillow, a rolled-up towel, or a lumbar roll in the curve of your back if you need extra support.   · Try a kneeling chair, which helps tilt your hips forward. This takes pressure off your lower back. · Try sitting on an exercise ball. It can rock from side to side, which helps keep your back loose. · When driving, keep your knees nearly level with your hips. Sit straight, and drive with both hands on the steering wheel. Your arms should be in a slightly bent position. Reduce stress on your back through careful lifting  · Squat down, bending at the hips and knees only. If you need to, put one knee to the floor and extend your other knee in front of you, bent at a right angle (half kneeling). · Press your chest straight forward. This helps keep your upper back straight while keeping a slight arch in your low back. · Hold the load as close to your body as possible, at the level of your belly button (navel). · Use your feet to change direction, taking small steps. · Lead with your hips as you change direction. Keep your shoulders in line with your hips as you move. · Set down your load carefully, squatting with your knees and hips only. Exercise and stretch your back  · Do some exercise on most days of the week, if your doctor says it is okay. You can walk, run, swim, or cycle. · Stretch your back muscles. Here are a few exercises to try:  Josephus Phlegm on your back, and gently pull one bent knee to your chest. Put that foot back on the floor, and then pull the other knee to your chest.  ¨ Do pelvic tilts. Lie on your back with your knees bent. Tighten your stomach muscles. Pull your belly button (navel) in and up toward your ribs. You should feel like your back is pressing to the floor and your hips and pelvis are slightly lifting off the floor. Hold for 6 seconds while breathing smoothly. ¨ Sit with your back flat against a wall. · Keep your core muscles strong. The muscles of your back, belly (abdomen), and buttocks support your spine. ¨ Pull in your belly and imagine pulling your navel toward your spine. Hold this for 6 seconds, then relax.  Remember to keep breathing normally as you tense your muscles. ¨ Do curl-ups. Always do them with your knees bent. Keep your low back on the floor, and curl your shoulders toward your knees using a smooth, slow motion. Keep your arms folded across your chest. If this bothers your neck, try putting your hands behind your neck (not your head), with your elbows spread apart. ¨ Lie on your back with your knees bent and your feet flat on the floor. Tighten your belly muscles, and then push with your feet and raise your buttocks up a few inches. Hold this position 6 seconds as you continue to breathe normally, then lower yourself slowly to the floor. Repeat 8 to 12 times. ¨ If you like group exercise, try Pilates or yoga. These classes have poses that strengthen the core muscles. Lead a healthy lifestyle  · Stay at a healthy weight to avoid strain on your back. · Do not smoke. Smoking increases the risk of osteoporosis, which weakens the spine. If you need help quitting, talk to your doctor about stop-smoking programs and medicines. These can increase your chances of quitting for good. Where can you learn more? Go to http://nereyda-ladan.info/. Enter L315 in the search box to learn more about \"Learning About How to Have a Healthy Back. \"  Current as of: May 23, 2016  Content Version: 11.1  © 0648-8905 [employer]. Care instructions adapted under license by [employer] (which disclaims liability or warranty for this information). If you have questions about a medical condition or this instruction, always ask your healthcare professional. [place] [address] any warranty or liability for your use of this information.

## 2017-10-03 PROBLEM — R52 PAIN: Status: ACTIVE | Noted: 2017-10-03

## 2017-10-03 PROBLEM — M48.00 SPINAL STENOSIS: Status: ACTIVE | Noted: 2017-10-03

## 2018-07-13 ENCOUNTER — TELEPHONE (OUTPATIENT)
Dept: FAMILY MEDICINE CLINIC | Age: 58
End: 2018-07-13

## 2018-07-13 DIAGNOSIS — M54.2 NECK PAIN ON RIGHT SIDE: Primary | ICD-10-CM

## 2018-07-13 DIAGNOSIS — M48.02 SPINAL STENOSIS OF CERVICAL REGION: ICD-10-CM

## 2018-08-18 ENCOUNTER — OFFICE VISIT (OUTPATIENT)
Dept: FAMILY MEDICINE CLINIC | Age: 58
End: 2018-08-18

## 2018-08-18 VITALS
DIASTOLIC BLOOD PRESSURE: 90 MMHG | OXYGEN SATURATION: 96 % | WEIGHT: 201.6 LBS | SYSTOLIC BLOOD PRESSURE: 148 MMHG | HEART RATE: 80 BPM | TEMPERATURE: 97.6 F | RESPIRATION RATE: 18 BRPM | BODY MASS INDEX: 29.86 KG/M2 | HEIGHT: 69 IN

## 2018-08-18 DIAGNOSIS — M54.2 CHRONIC NECK PAIN: ICD-10-CM

## 2018-08-18 DIAGNOSIS — M54.41 CHRONIC BILATERAL LOW BACK PAIN WITH BILATERAL SCIATICA: ICD-10-CM

## 2018-08-18 DIAGNOSIS — G89.29 CHRONIC NECK PAIN: ICD-10-CM

## 2018-08-18 DIAGNOSIS — G89.29 CHRONIC MIDLINE LOW BACK PAIN WITHOUT SCIATICA: Primary | ICD-10-CM

## 2018-08-18 DIAGNOSIS — M54.50 CHRONIC MIDLINE LOW BACK PAIN WITHOUT SCIATICA: Primary | ICD-10-CM

## 2018-08-18 DIAGNOSIS — G89.29 CHRONIC BILATERAL LOW BACK PAIN WITH BILATERAL SCIATICA: ICD-10-CM

## 2018-08-18 DIAGNOSIS — M54.42 CHRONIC BILATERAL LOW BACK PAIN WITH BILATERAL SCIATICA: ICD-10-CM

## 2018-08-18 RX ORDER — GABAPENTIN 800 MG/1
800 TABLET ORAL 3 TIMES DAILY
Refills: 0 | COMMUNITY
Start: 2018-08-09

## 2018-08-18 RX ORDER — OXYCODONE AND ACETAMINOPHEN 5; 325 MG/1; MG/1
TABLET ORAL
Qty: 28 TAB | Refills: 0 | Status: SHIPPED | OUTPATIENT
Start: 2018-08-18 | End: 2018-09-20 | Stop reason: SDUPTHER

## 2018-08-18 NOTE — PROGRESS NOTES
Tanner Loo is a 66 y.o. male presents in office for c.o pain after lower back steroid injection    Health Maintenance Due   Topic Date Due    DTaP/Tdap/Td series (1 - Tdap) 08/07/1983    Influenza Age 5 to Adult  08/01/2018       1. Have you been to the ER, urgent care clinic since your last visit? Hospitalized since your last visit? Yes. Patient had neck surgery. 2. Have you seen or consulted any other health care providers outside of the [address] [place] [address] since your last visit? Include any pap smears or colon screening.  No

## 2018-08-19 NOTE — PATIENT INSTRUCTIONS
Back Pain: Care Instructions  Your Care Instructions    Back pain has many possible causes. It is often related to problems with muscles and ligaments of the back. It may also be related to problems with the nerves, discs, or bones of the back. Moving, lifting, standing, sitting, or sleeping in an awkward way can strain the back. Sometimes you don't notice the injury until later. Arthritis is another common cause of back pain. Although it may hurt a lot, back pain usually improves on its own within several weeks. Most people recover in 12 weeks or less. Using good home treatment and being careful not to stress your back can help you feel better sooner. Follow-up care is a key part of your treatment and safety. Be sure to make and go to all appointments, and call your doctor if you are having problems. It's also a good idea to know your test results and keep a list of the medicines you take. How can you care for yourself at home? · Sit or lie in positions that are most comfortable and reduce your pain. Try one of these positions when you lie down:  ¨ Lie on your back with your knees bent and supported by large pillows. ¨ Lie on the floor with your legs on the seat of a sofa or chair. Tildon Floss on your side with your knees and hips bent and a pillow between your legs. ¨ Lie on your stomach if it does not make pain worse. · Do not sit up in bed, and avoid soft couches and twisted positions. Bed rest can help relieve pain at first, but it delays healing. Avoid bed rest after the first day of back pain. · Change positions every 30 minutes. If you must sit for long periods of time, take breaks from sitting. Get up and walk around, or lie in a comfortable position. · Try using a heating pad on a low or medium setting for 15 to 20 minutes every 2 or 3 hours. Try a warm shower in place of one session with the heating pad. · You can also try an ice pack for 10 to 15 minutes every 2 to 3 hours.  Put a thin cloth between the ice pack and your skin. · Take pain medicines exactly as directed. ¨ If the doctor gave you a prescription medicine for pain, take it as prescribed. ¨ If you are not taking a prescription pain medicine, ask your doctor if you can take an over-the-counter medicine. · Take short walks several times a day. You can start with 5 to 10 minutes, 3 or 4 times a day, and work up to longer walks. Walk on level surfaces and avoid hills and stairs until your back is better. · Return to work and other activities as soon as you can. Continued rest without activity is usually not good for your back. · To prevent future back pain, do exercises to stretch and strengthen your back and stomach. Learn how to use good posture, safe lifting techniques, and proper body mechanics. When should you call for help? Call your doctor now or seek immediate medical care if:    · You have new or worsening numbness in your legs.     · You have new or worsening weakness in your legs. (This could make it hard to stand up.)     · You lose control of your bladder or bowels.    Watch closely for changes in your health, and be sure to contact your doctor if:    · You have a fever, lose weight, or don't feel well.     · You do not get better as expected. Where can you learn more? Go to http://nereyda-ladan.info/. Enter G491 in the search box to learn more about \"Back Pain: Care Instructions. \"  Current as of: November 29, 2017  Content Version: 11.7  © 6562-5472 [employer]. Care instructions adapted under license by [employer] (which disclaims liability or warranty for this information). If you have questions about a medical condition or this instruction, always ask your healthcare professional. [place] [address] any warranty or liability for your use of this information.        Neck Pain: Care Instructions  Your Care Instructions    You can have neck pain anywhere from the bottom of your head to the top of your shoulders. It can spread to the upper back or arms. Injuries, painting a ceiling, sleeping with your neck twisted, staying in one position for too long, and many other activities can cause neck pain. Most neck pain gets better with home care. Your doctor may recommend medicine to relieve pain or relax your muscles. He or she may suggest exercise and physical therapy to increase flexibility and relieve stress. You may need to wear a special (cervical) collar to support your neck for a day or two. Follow-up care is a key part of your treatment and safety. Be sure to make and go to all appointments, and call your doctor if you are having problems. It's also a good idea to know your test results and keep a list of the medicines you take. How can you care for yourself at home? · Try using a heating pad on a low or medium setting for 15 to 20 minutes every 2 or 3 hours. Try a warm shower in place of one session with the heating pad. · You can also try an ice pack for 10 to 15 minutes every 2 to 3 hours. Put a thin cloth between the ice and your skin. · Take pain medicines exactly as directed. ¨ If the doctor gave you a prescription medicine for pain, take it as prescribed. ¨ If you are not taking a prescription pain medicine, ask your doctor if you can take an over-the-counter medicine. · If your doctor recommends a cervical collar, wear it exactly as directed. When should you call for help? Call your doctor now or seek immediate medical care if:    · You have new or worsening numbness in your arms, buttocks or legs.     · You have new or worsening weakness in your arms or legs. (This could make it hard to stand up.)     · You lose control of your bladder or bowels.    Watch closely for changes in your health, and be sure to contact your doctor if:    · Your neck pain is getting worse.     · You are not getting better after 1 week.     · You do not get better as expected. Where can you learn more? Go to http://nereyda-ladan.info/. Enter [id number] in the search box to learn more about \"Neck Pain: Care Instructions. \"  Current as of: November 29, 2017  Content Version: 11.7  © 2851-2556 [employer]. Care instructions adapted under license by [employer] (which disclaims liability or warranty for this information). If you have questions about a medical condition or this instruction, always ask your healthcare professional. [address] any warranty or liability for your use of this information. Chronic Pain: Care Instructions  Your Care Instructions    Chronic pain is pain that lasts a long time (months or even years) and may or may not have a clear cause. It is different from acute pain, which usually does have a clear cause-like an injury or illness-and gets better over time. Chronic pain:  · Lasts over time but may vary from day to day. · Does not go away despite efforts to end it. · May disrupt your sleep and lead to fatigue. · May cause depression or anxiety. · May make your muscles tense, causing more pain. · Can disrupt your work, hobbies, home life, and relationships with friends and family. Chronic pain is a very real condition. It is not just in your head. Treatment can help and usually includes several methods used together, such as medicines, physical therapy, exercise, and other treatments. Learning how to relax and changing negative thought patterns can also help you cope. Chronic pain is complex. Taking an active role in your treatment will help you better manage your pain. Tell your doctor if you have trouble dealing with your pain. You may have to try several things before you find what works best for you. Follow-up care is a key part of your treatment and safety. Be sure to make and go to all appointments, and call your doctor if you are having problems.  It's also a good idea to know your test results and keep a list of the medicines you take. How can you care for yourself at home? · Pace yourself. Break up large jobs into smaller tasks. Save harder tasks for days when you have less pain, or go back and forth between hard tasks and easier ones. Take rest breaks. · Relax, and reduce stress. Relaxation techniques such as deep breathing or meditation can help. · Keep moving. Gentle, daily exercise can help reduce pain over the long run. Try low- or no-impact exercises such as walking, swimming, and stationary biking. Do stretches to stay flexible. · Try heat, cold packs, and massage. · Get enough sleep. Chronic pain can make you tired and drain your energy. Talk with your doctor if you have trouble sleeping because of pain. · Think positive. Your thoughts can affect your pain level. Do things that you enjoy to distract yourself when you have pain instead of focusing on the pain. See a movie, read a book, listen to music, or spend time with a friend. · If you think you are depressed, talk to your doctor about treatment. · Keep a daily pain diary. Record how your moods, thoughts, sleep patterns, activities, and medicine affect your pain. You may find that your pain is worse during or after certain activities or when you are feeling a certain emotion. Having a record of your pain can help you and your doctor find the best ways to treat your pain. · Take pain medicines exactly as directed. ¨ If the doctor gave you a prescription medicine for pain, take it as prescribed. ¨ If you are not taking a prescription pain medicine, ask your doctor if you can take an over-the-counter medicine. Reducing constipation caused by pain medicine  · Include fruits, vegetables, beans, and whole grains in your diet each day. These foods are high in fiber. · Drink plenty of fluids, enough so that your urine is light yellow or clear like water.  If you have kidney, heart, or liver disease and have to limit fluids, talk with your doctor before you increase the amount of fluids you drink. · If your doctor recommends it, get more exercise. Walking is a good choice. Bit by bit, increase the amount you walk every day. Try for at least 30 minutes on most days of the week. · Schedule time each day for a bowel movement. A daily routine may help. Take your time and do not strain when having a bowel movement. When should you call for help? Call your doctor now or seek immediate medical care if:    · Your pain gets worse or is out of control.     · You feel down or blue, or you do not enjoy things like you once did. You may be depressed, which is common in people with chronic pain. Depression can be treated.     · You have vomiting or cramps for more than 2 hours.    Watch closely for changes in your health, and be sure to contact your doctor if:    · You cannot sleep because of pain.     · You are very worried or anxious about your pain.     · You have trouble taking your pain medicine.     · You have any concerns about your pain medicine.     · You have trouble with bowel movements, such as:  ¨ No bowel movement in 3 days. ¨ Blood in the anal area, in your stool, or on the toilet paper. ¨ Diarrhea for more than 24 hours. Where can you learn more? Go to http://nereyda-ladan.info/. Enter N004 in the search box to learn more about \"Chronic Pain: Care Instructions. \"  Current as of: October 9, 2017  Content Version: 11.7  © 3054-0435 [employer]. Care instructions adapted under license by [employer] (which disclaims liability or warranty for this information). If you have questions about a medical condition or this instruction, always ask your healthcare professional. [place] [address] any warranty or liability for your use of this information.

## 2018-08-19 NOTE — PROGRESS NOTES
Zuri Johnson is a 66 y.o.  male and presents with S/P multiple neck and back operations and injections still under post op  Care by Ortho. He does not seem to be getting better with injections and there is word that more surgeries are needed. Patient also for screening labs. Subjective: Additional Concerns: none. Patient Active Problem List    Diagnosis Date Noted    Spinal stenosis 10/03/2017    Pain 10/03/2017    Chronic left shoulder pain 05/27/2015    Chronic neck pain 11/13/2014    Neck pain on right side 05/09/2014    Erectile dysfunction 05/09/2014     Current Outpatient Prescriptions   Medication Sig Dispense Refill    gabapentin (NEURONTIN) 800 mg tablet Take 800 mg by mouth three (3) times daily. 0    oxyCODONE-acetaminophen (PERCOCET) 5-325 mg per tablet Take one tab po Q4h/PRN 28 Tab 0    traZODone (DESYREL) 100 mg tablet Take 100 mg by mouth nightly.  sildenafil citrate (VIAGRA) 50 mg tablet Take 1 Tab by mouth as needed. 10 Tab 0    gabapentin (NEURONTIN) 300 mg capsule Take 300 mg by mouth three (3) times daily.  cyclobenzaprine (FLEXERIL) 10 mg tablet Take 1 Tab by mouth nightly.  30 Tab 1     Allergies   Allergen Reactions    Codeine Nausea and Vomiting    Vicodin [Hydrocodone-Acetaminophen] Nausea Only     Past Medical History:   Diagnosis Date    Arm numbness left     Chronic pain     neck, back    DDD (degenerative disc disease)     Erectile dysfunction 5/9/2014    Stenosis      Past Surgical History:   Procedure Laterality Date    HX ORTHOPAEDIC      Laser sx for Lower back for a bulging disc    NEUROLOGICAL PROCEDURE UNLISTED  1998    laser back surgery     Family History   Problem Relation Age of Onset    COPD Mother     Cancer Father      brain met to whole body    No Known Problems Sister     No Known Problems Sister      Social History   Substance Use Topics    Smoking status: Former Smoker    Smokeless tobacco: Never Used    Alcohol use No       ROS     General: negative for - chills, fatigue, fever, weight change  Psych: negative for - anxiety, depression, irritability or mood swings  ENT: negative for - headaches, hearing change, nasal congestion, oral lesions, sneezing or sore throat  Heme/ Lymph: negative for - bleeding problems, bruising, pallor or swollen lymph nodes  Endo: negative for - hot flashes, polydipsia/polyuria or temperature intolerance  Resp: negative for - cough, shortness of breath or wheezing  CV: negative for - chest pain, edema or palpitations  GI: negative for - abdominal pain, change in bowel habits, constipation, diarrhea or nausea/vomiting  : negative for - dysuria, hematuria, incontinence, pelvic pain or vulvar/vaginal symptoms  MSK: positive for - joint pain, joint swelling or muscle pain  Neuro: negative for - confusion, headaches, seizures or weakness  Derm: negative for - dry skin, hair changes, rash or skin lesion changes    Objective:  Vitals:    08/18/18 1625   BP: 148/90   Pulse: 80   Resp: 18   Temp: 97.6 °F (36.4 °C)   TempSrc: Oral   SpO2: 96%   Weight: 201 lb 9.6 oz (91.4 kg)   Height: 5' 9\" (1.753 m)   PainSc:   9   PainLoc: Back     PE    alert, well appearing, and in no distress, oriented to person, place, and time and overweight  General appearance - alert, well appearing, and in no distress and oriented to person, place, and time  Mental status - alert, oriented to person, place, and time, normal mood, behavior, speech, dress, motor activity, and thought processes  Neck - supple, no significant adenopathy, very limited ROM flaxion and extension and side to side  Chest - clear to auscultation, no wheezes, rales or rhonchi, symmetric air entry  Heart - normal rate, regular rhythm, normal S1, S2, no murmurs, rubs, clicks or gallops  Extremities - peripheral pulses normal, no pedal edema, no clubbing or cyanosis    LABS   No visits with results within 6 Month(s) from this visit.   Latest known visit with results is:    Hospital Outpatient Visit on 09/28/2017   Component Date Value Ref Range Status    WBC 09/28/2017 6.6  4.0 - 11.0 1000/mm3 Final    RBC 09/28/2017 4.72  3.80 - 5.70 M/uL Final    HGB 09/28/2017 13.9  12.4 - 17.2 gm/dl Final    HCT 09/28/2017 41.3  37.0 - 50.0 % Final    MCV 09/28/2017 87.5  80.0 - 98.0 fL Final    MCH 09/28/2017 29.4  23.0 - 34.6 pg Final    MCHC 09/28/2017 33.7  30.0 - 36.0 gm/dl Final    PLATELET 12/94/0888 355  140 - 450 1000/mm3 Final    MPV 09/28/2017 10.5* 6.0 - 10.0 fL Final    RDW-SD 09/28/2017 41.8  35.1 - 43.9   Final    NRBC 09/28/2017 0  0 - 0   Final    IMMATURE GRANULOCYTES 09/28/2017 0.3  0.0 - 3.0 % Final    Comment: IG - Immature granulocytes (promyelocytes + myelocytes + metamyelocytes), their presence  indicates a left shift. An IG > 3% may predict positive blood cultures with 98% specificity.  (P<0.04) and 92% Positive Predictive Value (Jamila)1. Increased immature granulocytes  assist with the detection of infection and/or inflammation and may be present at an early stage  and are more sensitive and specific than band counts.       NEUTROPHILS 09/28/2017 50.7  34 - 64 % Final    LYMPHOCYTES 09/28/2017 36.0  28 - 48 % Final    MONOCYTES 09/28/2017 9.5  1 - 13 % Final    EOSINOPHILS 09/28/2017 2.1  0 - 5 % Final    BASOPHILS 09/28/2017 1.4  0 - 3 % Final    ABO/Rh(D) 09/28/2017 A Rh Positive    Final    Antibody screen 09/28/2017 NEG    Final       TESTS  Results for orders placed or performed during the hospital encounter of 09/28/17   CBC WITH AUTOMATED DIFF   Result Value Ref Range    WBC 6.6 4.0 - 11.0 1000/mm3    RBC 4.72 3.80 - 5.70 M/uL    HGB 13.9 12.4 - 17.2 gm/dl    HCT 41.3 37.0 - 50.0 %    MCV 87.5 80.0 - 98.0 fL    MCH 29.4 23.0 - 34.6 pg    MCHC 33.7 30.0 - 36.0 gm/dl    PLATELET 818 077 - 527 1000/mm3    MPV 10.5 (H) 6.0 - 10.0 fL    RDW-SD 41.8 35.1 - 43.9      NRBC 0 0 - 0      IMMATURE GRANULOCYTES 0.3 0.0 - 3.0 % NEUTROPHILS 50.7 34 - 64 %    LYMPHOCYTES 36.0 28 - 48 %    MONOCYTES 9.5 1 - 13 %    EOSINOPHILS 2.1 0 - 5 %    BASOPHILS 1.4 0 - 3 %   TYPE, ABO & RH   Result Value Ref Range    ABO/Rh(D) A Rh Positive     ANTIBODY SCREEN   Result Value Ref Range    Antibody screen NEG       Assessment/Plan:      1. Chronic midline low back pain without sciatica  - gabapentin (NEURONTIN) 800 mg tablet; Take 800 mg by mouth three (3) times daily. ; Refill: 0  - REFERRAL TO PAIN MANAGEMENT  - LIPID PANEL; Future  - CBC WITH AUTOMATED DIFF; Future  - METABOLIC PANEL, COMPREHENSIVE; Future  - TSH 3RD GENERATION; Future  - HEMOGLOBIN A1C WITH EAG; Future    2. Chronic neck pain  - gabapentin (NEURONTIN) 800 mg tablet; Take 800 mg by mouth three (3) times daily. ; Refill: 0  - oxyCODONE-acetaminophen (PERCOCET) 5-325 mg per tablet; Take one tab po Q4h/PRN  Dispense: 28 Tab; Refill: 0    3. Chronic bilateral low back pain with bilateral sciatica - Controlled substance agreement read and signed. Patient given short term refill of   - gabapentin (NEURONTIN) 800 mg tablet; Take 800 mg by mouth three (3) times daily. ; Refill: 0  - oxyCODONE-acetaminophen (PERCOCET) 5-325 mg per tablet; Take one tab po Q4h/PRN  Dispense: 28 Tab; Refill: 0    Patient advised to follow closely with Ortho and pain management as his condition is above primary care. Patient expressed undestanding. Lab review: orders written for new lab studies as appropriate; see orders. We will call for results and further plan. I have discussed the diagnosis with the patient and the intended plan as seen in the above orders. The patient has received an after-visit summary and questions were answered concerning future plans. I have discussed medication side effects and warnings with the patient as well. I have reviewed the plan of care with the patient, accepted their input and they are in agreement with the treatment goals. F/U as needed. 3 months routine. Rigoberto Bobo MD

## 2018-09-15 DIAGNOSIS — M54.2 CHRONIC NECK PAIN: ICD-10-CM

## 2018-09-15 DIAGNOSIS — M54.42 CHRONIC BILATERAL LOW BACK PAIN WITH BILATERAL SCIATICA: ICD-10-CM

## 2018-09-15 DIAGNOSIS — G89.29 CHRONIC BILATERAL LOW BACK PAIN WITH BILATERAL SCIATICA: ICD-10-CM

## 2018-09-15 DIAGNOSIS — G89.29 CHRONIC NECK PAIN: ICD-10-CM

## 2018-09-15 DIAGNOSIS — M54.41 CHRONIC BILATERAL LOW BACK PAIN WITH BILATERAL SCIATICA: ICD-10-CM

## 2018-09-15 RX ORDER — OXYCODONE AND ACETAMINOPHEN 5; 325 MG/1; MG/1
TABLET ORAL
Qty: 28 TAB | Refills: 0 | Status: CANCELLED | OUTPATIENT
Start: 2018-09-15

## 2018-09-15 NOTE — TELEPHONE ENCOUNTER
Requested Prescriptions     Pending Prescriptions Disp Refills    oxyCODONE-acetaminophen (PERCOCET) 5-325 mg per tablet 28 Tab 0     Sig: Take one tab po Q4h/PRN     Print      They have 0 tablets remaining. Pts last appt was 8/18/18, Next appt is scheduled for 9/21/18. Pt aware of 72 hours time frame. Pt stated that he doesn't see the pain specialist until 9/26/18 and he would like enough pain medication until then.

## 2018-09-17 NOTE — TELEPHONE ENCOUNTER
We do not do controlled substance refills without an appointment and the patient had an appointment with [place] pain management 09/06/2018.

## 2018-09-17 NOTE — TELEPHONE ENCOUNTER
Patient has been called to be notified that he will need to wait until his follow up apt. On 09/21 in order to receive this refill but no answer from patient. Unable to leave message as no voicemail box set up at this time. Closing encounter.

## 2018-09-20 ENCOUNTER — OFFICE VISIT (OUTPATIENT)
Dept: FAMILY MEDICINE CLINIC | Age: 58
End: 2018-09-20

## 2018-09-20 VITALS
SYSTOLIC BLOOD PRESSURE: 128 MMHG | TEMPERATURE: 97.9 F | DIASTOLIC BLOOD PRESSURE: 85 MMHG | HEIGHT: 69 IN | BODY MASS INDEX: 29.47 KG/M2 | OXYGEN SATURATION: 97 % | WEIGHT: 199 LBS | HEART RATE: 78 BPM | RESPIRATION RATE: 17 BRPM

## 2018-09-20 DIAGNOSIS — M54.41 CHRONIC BILATERAL LOW BACK PAIN WITH BILATERAL SCIATICA: ICD-10-CM

## 2018-09-20 DIAGNOSIS — G89.29 CHRONIC BILATERAL LOW BACK PAIN WITH BILATERAL SCIATICA: ICD-10-CM

## 2018-09-20 DIAGNOSIS — M54.2 CHRONIC NECK PAIN: ICD-10-CM

## 2018-09-20 DIAGNOSIS — M54.42 CHRONIC BILATERAL LOW BACK PAIN WITH BILATERAL SCIATICA: ICD-10-CM

## 2018-09-20 DIAGNOSIS — G89.29 CHRONIC NECK PAIN: ICD-10-CM

## 2018-09-20 RX ORDER — OXYCODONE AND ACETAMINOPHEN 5; 325 MG/1; MG/1
TABLET ORAL
Qty: 28 TAB | Refills: 0 | Status: SHIPPED | OUTPATIENT
Start: 2018-09-20 | End: 2018-11-13 | Stop reason: SDUPTHER

## 2018-09-20 NOTE — PROGRESS NOTES
Romeo Keene is a 66 y.o. male presents to office for back pain that radiates down left leg    Medication list has been reviewed with Romeo Keene and updated as of today's date     Health Maintenance items with a due date reviewed with patient:  Health Maintenance Due   Topic Date Due    DTaP/Tdap/Td series (1 - Tdap) 08/07/1983    Influenza Age 5 to Adult  08/01/2018

## 2018-09-20 NOTE — PATIENT INSTRUCTIONS
Back Pain: Care Instructions  Your Care Instructions    Back pain has many possible causes. It is often related to problems with muscles and ligaments of the back. It may also be related to problems with the nerves, discs, or bones of the back. Moving, lifting, standing, sitting, or sleeping in an awkward way can strain the back. Sometimes you don't notice the injury until later. Arthritis is another common cause of back pain. Although it may hurt a lot, back pain usually improves on its own within several weeks. Most people recover in 12 weeks or less. Using good home treatment and being careful not to stress your back can help you feel better sooner. Follow-up care is a key part of your treatment and safety. Be sure to make and go to all appointments, and call your doctor if you are having problems. It's also a good idea to know your test results and keep a list of the medicines you take. How can you care for yourself at home? · Sit or lie in positions that are most comfortable and reduce your pain. Try one of these positions when you lie down:  ¨ Lie on your back with your knees bent and supported by large pillows. ¨ Lie on the floor with your legs on the seat of a sofa or chair. Walt Frames on your side with your knees and hips bent and a pillow between your legs. ¨ Lie on your stomach if it does not make pain worse. · Do not sit up in bed, and avoid soft couches and twisted positions. Bed rest can help relieve pain at first, but it delays healing. Avoid bed rest after the first day of back pain. · Change positions every 30 minutes. If you must sit for long periods of time, take breaks from sitting. Get up and walk around, or lie in a comfortable position. · Try using a heating pad on a low or medium setting for 15 to 20 minutes every 2 or 3 hours. Try a warm shower in place of one session with the heating pad. · You can also try an ice pack for 10 to 15 minutes every 2 to 3 hours.  Put a thin cloth between the ice pack and your skin. · Take pain medicines exactly as directed. ¨ If the doctor gave you a prescription medicine for pain, take it as prescribed. ¨ If you are not taking a prescription pain medicine, ask your doctor if you can take an over-the-counter medicine. · Take short walks several times a day. You can start with 5 to 10 minutes, 3 or 4 times a day, and work up to longer walks. Walk on level surfaces and avoid hills and stairs until your back is better. · Return to work and other activities as soon as you can. Continued rest without activity is usually not good for your back. · To prevent future back pain, do exercises to stretch and strengthen your back and stomach. Learn how to use good posture, safe lifting techniques, and proper body mechanics. When should you call for help? Call your doctor now or seek immediate medical care if:    · You have new or worsening numbness in your legs.     · You have new or worsening weakness in your legs. (This could make it hard to stand up.)     · You lose control of your bladder or bowels.    Watch closely for changes in your health, and be sure to contact your doctor if:    · You have a fever, lose weight, or don't feel well.     · You do not get better as expected. Where can you learn more? Go to http://nereyda-ladan.info/. Enter I975 in the search box to learn more about \"Back Pain: Care Instructions. \"  Current as of: November 29, 2017  Content Version: 11.7  © 9278-7741 [employer]. Care instructions adapted under license by [employer] (which disclaims liability or warranty for this information). If you have questions about a medical condition or this instruction, always ask your healthcare professional. [place] [address] any warranty or liability for your use of this information.

## 2018-09-21 NOTE — PROGRESS NOTES
Tanner Loo is a 66 y.o.  male and presents with need to refill his opioid pain medication for last time  Before being seen by pain management. Chief Complaint   Patient presents with    Back Pain     Subjective: Additional Concerns: none    Patient Active Problem List    Diagnosis Date Noted    Spinal stenosis 10/03/2017    Pain 10/03/2017    Chronic left shoulder pain 05/27/2015    Chronic neck pain 11/13/2014    Neck pain on right side 05/09/2014    Erectile dysfunction 05/09/2014     Current Outpatient Prescriptions   Medication Sig Dispense Refill    oxyCODONE-acetaminophen (PERCOCET) 5-325 mg per tablet Take one tab po Q4h/PRN 28 Tab 0    gabapentin (NEURONTIN) 800 mg tablet Take 800 mg by mouth three (3) times daily. 0    traZODone (DESYREL) 100 mg tablet Take 100 mg by mouth nightly.  gabapentin (NEURONTIN) 300 mg capsule Take 300 mg by mouth three (3) times daily.  sildenafil citrate (VIAGRA) 50 mg tablet Take 1 Tab by mouth as needed. 10 Tab 0    cyclobenzaprine (FLEXERIL) 10 mg tablet Take 1 Tab by mouth nightly.  30 Tab 1     Allergies   Allergen Reactions    Codeine Nausea and Vomiting    Vicodin [Hydrocodone-Acetaminophen] Nausea Only     Past Medical History:   Diagnosis Date    Arm numbness left     Chronic pain     neck, back    DDD (degenerative disc disease)     Erectile dysfunction 5/9/2014    Stenosis      Past Surgical History:   Procedure Laterality Date    HX ORTHOPAEDIC      Laser sx for Lower back for a bulging disc    NEUROLOGICAL PROCEDURE UNLISTED  1998    laser back surgery     Family History   Problem Relation Age of Onset    COPD Mother     Cancer Father      brain met to whole body    No Known Problems Sister     No Known Problems Sister      Social History   Substance Use Topics    Smoking status: Former Smoker    Smokeless tobacco: Never Used    Alcohol use No     ROS     General: negative for - chills, fatigue, fever, weight change  Resp: negative for - cough, shortness of breath or wheezing  CV: negative for - chest pain, edema or palpitations  MSK: negative for - joint pain, joint swelling, positive for low back muscle pain  Neuro: negative for - confusion, headaches, seizures or weakness    Objective:  Vitals:    09/20/18 1340   BP: 128/85   Pulse: 78   Resp: 17   Temp: 97.9 °F (36.6 °C)   TempSrc: Oral   SpO2: 97%   Weight: 199 lb (90.3 kg)   Height: 5' 9\" (1.753 m)   PainSc:  10 - Worst pain ever     PE    Alert, well appearing, and in no distress, oriented to person, place, and time and overweight  General appearance - alert, well appearing, and in no distress, oriented to person, place, and time and overweight  Mental status - alert, oriented to person, place, and time, normal mood, behavior, speech, dress, motor activity, and thought processes  Chest - clear to auscultation, no wheezes, rales or rhonchi, symmetric air entry  Heart - normal rate, regular rhythm, normal S1, S2, no murmurs, rubs, clicks or gallops  Extremities - peripheral pulses normal, no pedal edema, no clubbing or cyanosis    LABS   No visits with results within 6 Month(s) from this visit.   Latest known visit with results is:    Hospital Outpatient Visit on 09/28/2017   Component Date Value Ref Range Status    WBC 09/28/2017 6.6  4.0 - 11.0 1000/mm3 Final    RBC 09/28/2017 4.72  3.80 - 5.70 M/uL Final    HGB 09/28/2017 13.9  12.4 - 17.2 gm/dl Final    HCT 09/28/2017 41.3  37.0 - 50.0 % Final    MCV 09/28/2017 87.5  80.0 - 98.0 fL Final    MCH 09/28/2017 29.4  23.0 - 34.6 pg Final    MCHC 09/28/2017 33.7  30.0 - 36.0 gm/dl Final    PLATELET 05/00/8715 913  140 - 450 1000/mm3 Final    MPV 09/28/2017 10.5* 6.0 - 10.0 fL Final    RDW-SD 09/28/2017 41.8  35.1 - 43.9   Final    NRBC 09/28/2017 0  0 - 0   Final    IMMATURE GRANULOCYTES 09/28/2017 0.3  0.0 - 3.0 % Final    Comment: IG - Immature granulocytes (promyelocytes + myelocytes + metamyelocytes), their presence  indicates a left shift. An IG > 3% may predict positive blood cultures with 98% specificity.  (P<0.04) and 92% Positive Predictive Value (Jamila)1. Increased immature granulocytes  assist with the detection of infection and/or inflammation and may be present at an early stage  and are more sensitive and specific than band counts.  NEUTROPHILS 09/28/2017 50.7  34 - 64 % Final    LYMPHOCYTES 09/28/2017 36.0  28 - 48 % Final    MONOCYTES 09/28/2017 9.5  1 - 13 % Final    EOSINOPHILS 09/28/2017 2.1  0 - 5 % Final    BASOPHILS 09/28/2017 1.4  0 - 3 % Final    ABO/Rh(D) 09/28/2017 A Rh Positive    Final    Antibody screen 09/28/2017 NEG    Final       TESTS  Results for orders placed or performed during the hospital encounter of 09/28/17   CBC WITH AUTOMATED DIFF   Result Value Ref Range    WBC 6.6 4.0 - 11.0 1000/mm3    RBC 4.72 3.80 - 5.70 M/uL    HGB 13.9 12.4 - 17.2 gm/dl    HCT 41.3 37.0 - 50.0 %    MCV 87.5 80.0 - 98.0 fL    MCH 29.4 23.0 - 34.6 pg    MCHC 33.7 30.0 - 36.0 gm/dl    PLATELET 108 547 - 124 1000/mm3    MPV 10.5 (H) 6.0 - 10.0 fL    RDW-SD 41.8 35.1 - 43.9      NRBC 0 0 - 0      IMMATURE GRANULOCYTES 0.3 0.0 - 3.0 %    NEUTROPHILS 50.7 34 - 64 %    LYMPHOCYTES 36.0 28 - 48 %    MONOCYTES 9.5 1 - 13 %    EOSINOPHILS 2.1 0 - 5 %    BASOPHILS 1.4 0 - 3 %   TYPE, ABO & RH   Result Value Ref Range    ABO/Rh(D) A Rh Positive     ANTIBODY SCREEN   Result Value Ref Range    Antibody screen NEG       Assessment/Plan:      1. Chronic neck pain  - oxyCODONE-acetaminophen (PERCOCET) 5-325 mg per tablet; Take one tab po Q4h/PRN  Dispense: 28 Tab; Refill: 0    2. Chronic bilateral low back pain with bilateral sciatica  - oxyCODONE-acetaminophen (PERCOCET) 5-325 mg per tablet; Take one tab po Q4h/PRN  Dispense: 28 Tab; Refill: 0    Lab review: orders written for new lab studies as appropriate; see orders.      I have discussed the diagnosis with the patient and the intended plan as seen in the above orders. The patient has received an after-visit summary and questions were answered concerning future plans. I have discussed medication side effects and warnings with the patient as well. I have reviewed the plan of care with the patient, accepted their input and they are in agreement with the treatment goals. F/U as needed.      Jered Mercado MD

## 2018-11-13 ENCOUNTER — OFFICE VISIT (OUTPATIENT)
Dept: FAMILY MEDICINE CLINIC | Age: 58
End: 2018-11-13

## 2018-11-13 VITALS
SYSTOLIC BLOOD PRESSURE: 143 MMHG | RESPIRATION RATE: 17 BRPM | HEART RATE: 84 BPM | OXYGEN SATURATION: 98 % | DIASTOLIC BLOOD PRESSURE: 96 MMHG | TEMPERATURE: 98.7 F | HEIGHT: 69 IN | BODY MASS INDEX: 29.21 KG/M2 | WEIGHT: 197.2 LBS

## 2018-11-13 DIAGNOSIS — M54.42 CHRONIC BILATERAL LOW BACK PAIN WITH BILATERAL SCIATICA: ICD-10-CM

## 2018-11-13 DIAGNOSIS — G89.29 CHRONIC LEFT SHOULDER PAIN: ICD-10-CM

## 2018-11-13 DIAGNOSIS — M54.41 CHRONIC BILATERAL LOW BACK PAIN WITH BILATERAL SCIATICA: ICD-10-CM

## 2018-11-13 DIAGNOSIS — N52.9 ERECTILE DYSFUNCTION, UNSPECIFIED ERECTILE DYSFUNCTION TYPE: Primary | ICD-10-CM

## 2018-11-13 DIAGNOSIS — G89.29 CHRONIC BILATERAL LOW BACK PAIN WITH BILATERAL SCIATICA: ICD-10-CM

## 2018-11-13 DIAGNOSIS — G89.29 CHRONIC NECK PAIN: ICD-10-CM

## 2018-11-13 DIAGNOSIS — M54.2 CHRONIC NECK PAIN: ICD-10-CM

## 2018-11-13 DIAGNOSIS — M48.02 SPINAL STENOSIS OF CERVICAL REGION: ICD-10-CM

## 2018-11-13 DIAGNOSIS — M25.512 CHRONIC LEFT SHOULDER PAIN: ICD-10-CM

## 2018-11-13 RX ORDER — OXYCODONE AND ACETAMINOPHEN 5; 325 MG/1; MG/1
TABLET ORAL
Qty: 24 TAB | Refills: 0 | Status: SHIPPED | OUTPATIENT
Start: 2018-11-13 | End: 2018-11-28 | Stop reason: SDUPTHER

## 2018-11-13 NOTE — PROGRESS NOTES
Vijay Felix is a 66 y.o. male presents to office for back pain    Medication list has been reviewed with Vijay Felix and updated as of today's date     Health Maintenance items with a due date reviewed with patient:  Health Maintenance Due   Topic Date Due    DTaP/Tdap/Td series (1 - Tdap) 08/07/1983    Shingrix Vaccine Age 50> (1 of 2) 08/07/2012    Influenza Age 5 to Adult  08/01/2018

## 2018-11-14 NOTE — PROGRESS NOTES
José Miguel Guajardo     Chief Complaint   Patient presents with    Back Pain     Vitals:    11/13/18 1410   BP: (!) 143/96   Pulse: 84   Resp: 17   Temp: 98.7 °F (37.1 °C)   TempSrc: Oral   SpO2: 98%   Weight: 197 lb 3.2 oz (89.4 kg)   Height: 5' 9\" (1.753 m)   PainSc:  10 - Worst pain ever         HPI: Patient is here for medication refill for chronic back pain, he is taking Percocet as needed for pain, he has an appointment with pain management on November 27. He has no other concerns or complaints today. Patient  was checked consistent with Dr. Diogenes Varma prescription. History of chronic neck pain and he had  anterior cervical discectomy and fusion (ACDF)      Low back pain. She is elevated today patient has no history of hypertension, need follow-up for blood pressure      Past Medical History:   Diagnosis Date    Arm numbness left     Chronic pain     neck, back    DDD (degenerative disc disease)     Erectile dysfunction 5/9/2014    Stenosis      Past Surgical History:   Procedure Laterality Date    HX ORTHOPAEDIC      Laser sx for Lower back for a bulging disc    NEUROLOGICAL PROCEDURE UNLISTED  1998    laser back surgery     Social History     Tobacco Use    Smoking status: Former Smoker    Smokeless tobacco: Never Used   Substance Use Topics    Alcohol use: No       Family History   Problem Relation Age of Onset    COPD Mother     Cancer Father         brain met to whole body    No Known Problems Sister     No Known Problems Sister        Review of Systems   Constitutional: Negative for chills, fever, malaise/fatigue and weight loss. HENT: Negative for congestion, ear discharge, ear pain, hearing loss and nosebleeds. Eyes: Negative for blurred vision, double vision and discharge. Respiratory: Negative for cough. Cardiovascular: Negative for chest pain, palpitations, claudication and leg swelling.    Gastrointestinal: Negative for abdominal pain, constipation, diarrhea, nausea and vomiting. Genitourinary: Negative for dysuria, frequency and urgency. Musculoskeletal: Positive for back pain and neck pain. Negative for falls, joint pain and myalgias. Skin: Negative for itching and rash. Neurological: Negative for dizziness, tingling, sensory change, speech change, focal weakness, weakness and headaches. Psychiatric/Behavioral: Negative for depression, hallucinations, substance abuse and suicidal ideas. The patient is not nervous/anxious. Physical Exam   Constitutional: He is oriented to person, place, and time. He appears well-developed and well-nourished. No distress. HENT:   Head: Normocephalic and atraumatic. Mouth/Throat: No oropharyngeal exudate. Eyes: Conjunctivae are normal. Pupils are equal, round, and reactive to light. Right eye exhibits no discharge. Left eye exhibits no discharge. No scleral icterus. Pulmonary/Chest: Effort normal.   Musculoskeletal: Normal range of motion. He exhibits no deformity. Neurological: He is alert and oriented to person, place, and time. No cranial nerve deficit. Skin: Skin is warm and dry. No rash noted. He is not diaphoretic. No erythema. Psychiatric: He has a normal mood and affect. Judgment and thought content normal.   Nursing note and vitals reviewed. Assessment and plan     1. Erectile dysfunction, unspecified erectile dysfunction type  On Viagra as needed    2. Chronic left shoulder pain      3. Chronic neck pain    Anterior cervical discectomy and fusion (ACDF)      - oxyCODONE-acetaminophen (PERCOCET) 5-325 mg per tablet; Take one tab po Q4h/PRN  Dispense: 24 Tab; Refill: 0    4. Spinal stenosis of cervical region      5. Chronic bilateral low back pain with bilateral sciatica    - oxyCODONE-acetaminophen (PERCOCET) 5-325 mg per tablet; Take one tab po Q4h/PRN  Dispense: 24 Tab;  Refill: 0    Current Outpatient Medications   Medication Sig Dispense Refill    oxyCODONE-acetaminophen (PERCOCET) 5-325 mg per tablet Take one tab po Q4h/PRN 24 Tab 0    gabapentin (NEURONTIN) 800 mg tablet Take 800 mg by mouth three (3) times daily. 0    traZODone (DESYREL) 100 mg tablet Take 100 mg by mouth nightly.  sildenafil citrate (VIAGRA) 50 mg tablet Take 1 Tab by mouth as needed. 10 Tab 0       Patient Active Problem List    Diagnosis Date Noted    Spinal stenosis 10/03/2017    Pain 10/03/2017    Chronic left shoulder pain 05/27/2015    Chronic neck pain 11/13/2014    Neck pain on right side 05/09/2014    Erectile dysfunction 05/09/2014     Results for orders placed or performed during the hospital encounter of 09/28/17   CBC WITH AUTOMATED DIFF   Result Value Ref Range    WBC 6.6 4.0 - 11.0 1000/mm3    RBC 4.72 3.80 - 5.70 M/uL    HGB 13.9 12.4 - 17.2 gm/dl    HCT 41.3 37.0 - 50.0 %    MCV 87.5 80.0 - 98.0 fL    MCH 29.4 23.0 - 34.6 pg    MCHC 33.7 30.0 - 36.0 gm/dl    PLATELET 684 088 - 380 1000/mm3    MPV 10.5 (H) 6.0 - 10.0 fL    RDW-SD 41.8 35.1 - 43.9      NRBC 0 0 - 0      IMMATURE GRANULOCYTES 0.3 0.0 - 3.0 %    NEUTROPHILS 50.7 34 - 64 %    LYMPHOCYTES 36.0 28 - 48 %    MONOCYTES 9.5 1 - 13 %    EOSINOPHILS 2.1 0 - 5 %    BASOPHILS 1.4 0 - 3 %   TYPE, ABO & RH   Result Value Ref Range    ABO/Rh(D) A Rh Positive     ANTIBODY SCREEN   Result Value Ref Range    Antibody screen NEG       No visits with results within 3 Month(s) from this visit.    Latest known visit with results is:   Hospital Outpatient Visit on 09/28/2017   Component Date Value Ref Range Status    WBC 09/28/2017 6.6  4.0 - 11.0 1000/mm3 Final    RBC 09/28/2017 4.72  3.80 - 5.70 M/uL Final    HGB 09/28/2017 13.9  12.4 - 17.2 gm/dl Final    HCT 09/28/2017 41.3  37.0 - 50.0 % Final    MCV 09/28/2017 87.5  80.0 - 98.0 fL Final    MCH 09/28/2017 29.4  23.0 - 34.6 pg Final    MCHC 09/28/2017 33.7  30.0 - 36.0 gm/dl Final    PLATELET 02/34/0335 559  140 - 450 1000/mm3 Final    MPV 09/28/2017 10.5* 6.0 - 10.0 fL Final    RDW-SD 09/28/2017 41.8  35.1 - 43.9   Final    NRBC 09/28/2017 0  0 - 0   Final    IMMATURE GRANULOCYTES 09/28/2017 0.3  0.0 - 3.0 % Final    Comment: IG - Immature granulocytes (promyelocytes + myelocytes + metamyelocytes), their presence  indicates a left shift. An IG > 3% may predict positive blood cultures with 98% specificity.  (P<0.04) and 92% Positive Predictive Value (Sadiq-Erma)1. Increased immature granulocytes  assist with the detection of infection and/or inflammation and may be present at an early stage  and are more sensitive and specific than band counts.  NEUTROPHILS 09/28/2017 50.7  34 - 64 % Final    LYMPHOCYTES 09/28/2017 36.0  28 - 48 % Final    MONOCYTES 09/28/2017 9.5  1 - 13 % Final    EOSINOPHILS 09/28/2017 2.1  0 - 5 % Final    BASOPHILS 09/28/2017 1.4  0 - 3 % Final    ABO/Rh(D) 09/28/2017 A Rh Positive    Final    Antibody screen 09/28/2017 NEG    Final          Follow-up Disposition:  Return if symptoms worsen or fail to improve.

## 2018-11-19 ENCOUNTER — DOCUMENTATION ONLY (OUTPATIENT)
Dept: FAMILY MEDICINE CLINIC | Age: 58
End: 2018-11-19

## 2018-11-28 ENCOUNTER — OFFICE VISIT (OUTPATIENT)
Dept: FAMILY MEDICINE CLINIC | Age: 58
End: 2018-11-28

## 2018-11-28 VITALS
DIASTOLIC BLOOD PRESSURE: 89 MMHG | SYSTOLIC BLOOD PRESSURE: 137 MMHG | RESPIRATION RATE: 18 BRPM | TEMPERATURE: 98 F | HEART RATE: 68 BPM | HEIGHT: 69 IN | WEIGHT: 197.8 LBS | OXYGEN SATURATION: 100 % | BODY MASS INDEX: 29.3 KG/M2

## 2018-11-28 DIAGNOSIS — M54.41 CHRONIC BILATERAL LOW BACK PAIN WITH BILATERAL SCIATICA: ICD-10-CM

## 2018-11-28 DIAGNOSIS — M54.42 CHRONIC BILATERAL LOW BACK PAIN WITH BILATERAL SCIATICA: ICD-10-CM

## 2018-11-28 DIAGNOSIS — G89.29 CHRONIC BILATERAL LOW BACK PAIN WITH BILATERAL SCIATICA: ICD-10-CM

## 2018-11-28 DIAGNOSIS — M54.2 CHRONIC NECK PAIN: ICD-10-CM

## 2018-11-28 DIAGNOSIS — G89.29 CHRONIC NECK PAIN: ICD-10-CM

## 2018-11-28 RX ORDER — OXYCODONE AND ACETAMINOPHEN 5; 325 MG/1; MG/1
TABLET ORAL
Qty: 24 TAB | Refills: 0 | Status: SHIPPED | OUTPATIENT
Start: 2018-11-28 | End: 2019-05-08

## 2018-11-28 RX ORDER — NALOXONE HYDROCHLORIDE 4 MG/.1ML
SPRAY NASAL
Qty: 1 EACH | Refills: 1 | Status: SHIPPED | OUTPATIENT
Start: 2018-11-28

## 2018-11-28 NOTE — PROGRESS NOTES
Rosario Solitario is a 66 y.o. male presents to office for back pain and neck pain    Medication list has been reviewed with Rosario Solitario and updated as of today's date     Health Maintenance items with a due date reviewed with patient:  Health Maintenance Due   Topic Date Due    DTaP/Tdap/Td series (1 - Tdap) 08/07/1983    Shingrix Vaccine Age 50> (1 of 2) 08/07/2012

## 2018-11-28 NOTE — PROGRESS NOTES
José Miguel Guajardo     Chief Complaint   Patient presents with    Back Pain    Neck Pain     Vitals:    11/28/18 1112   BP: 137/89   Pulse: 68   Resp: 18   Temp: 98 °F (36.7 °C)   TempSrc: Oral   SpO2: 100%   Weight: 197 lb 12.8 oz (89.7 kg)   Height: 5' 9\" (1.753 m)   PainSc:  10 - Worst pain ever         HPI: Patient is here to requesting refill for his pain medications. Patient did have an appointment was [facility] on 27 November, that he he was called and got rescheduled, had Crystal called to us with pain management and they said the patient was going to be charged self payment fees and then he could not afford it. And now has an appointment on 6 December. Plate with the patient that not prescribe narcotic on long-term he should be seen by pain management, I will give him prescription today and that would be his last prescription from me. Patient verbalized understanding        Past Medical History:   Diagnosis Date    Arm numbness left     Chronic pain     neck, back    DDD (degenerative disc disease)     Erectile dysfunction 5/9/2014    Stenosis      Past Surgical History:   Procedure Laterality Date    HX ORTHOPAEDIC      Laser sx for Lower back for a bulging disc    NEUROLOGICAL PROCEDURE UNLISTED  1998    laser back surgery     Social History     Tobacco Use    Smoking status: Former Smoker    Smokeless tobacco: Never Used   Substance Use Topics    Alcohol use: No       Family History   Problem Relation Age of Onset    COPD Mother    [facility] Father         brain met to whole body    No Known Problems Sister     No Known Problems Sister        Review of Systems   Constitutional: Negative for chills, fever, malaise/fatigue and weight loss. HENT: Negative for congestion, ear discharge, ear pain, hearing loss and nosebleeds. Eyes: Negative for blurred vision, double vision and discharge. Respiratory: Negative for cough.     Cardiovascular: Negative for chest pain, palpitations, claudication and leg swelling. Gastrointestinal: Negative for abdominal pain, constipation, diarrhea, nausea and vomiting. Genitourinary: Negative for dysuria, frequency and urgency. Musculoskeletal: Positive for back pain and neck pain. Negative for myalgias. Skin: Negative for itching and rash. Neurological: Positive for tingling. Negative for dizziness, sensory change, speech change, focal weakness, weakness and headaches. Psychiatric/Behavioral: Negative for depression and suicidal ideas. Physical Exam   Constitutional: He is oriented to person, place, and time. HENT:   Head: Normocephalic and atraumatic. Pulmonary/Chest: Effort normal.   Musculoskeletal: Normal range of motion. Neurological: He is alert and oriented to person, place, and time. He has normal reflexes. Skin: Skin is warm and dry. Nursing note and vitals reviewed. Assessment and plan     1. Chronic neck pain    - oxyCODONE-acetaminophen (PERCOCET) 5-325 mg per tablet; Take one tab po Q4h/PRN  Dispense: 24 Tab; Refill: 0  - naloxone (NARCAN) 4 mg/actuation nasal spray; Use 1 spray intranasally, then discard. Repeat with new spray every 2 min as needed for opioid overdose symptoms, alternating nostrils. Dispense: 1 Each; Refill: 1    2. Chronic bilateral low back pain with bilateral sciatica    - oxyCODONE-acetaminophen (PERCOCET) 5-325 mg per tablet; Take one tab po Q4h/PRN  Dispense: 24 Tab; Refill: 0  - naloxone (NARCAN) 4 mg/actuation nasal spray; Use 1 spray intranasally, then discard. Repeat with new spray every 2 min as needed for opioid overdose symptoms, alternating nostrils. Dispense: 1 Each; Refill: 1    Current Outpatient Medications   Medication Sig Dispense Refill    oxyCODONE-acetaminophen (PERCOCET) 5-325 mg per tablet Take one tab po Q4h/PRN 24 Tab 0    naloxone (NARCAN) 4 mg/actuation nasal spray Use 1 spray intranasally, then discard.  Repeat with new spray every 2 min as needed for opioid overdose symptoms, alternating nostrils. 1 Each 1    gabapentin (NEURONTIN) 800 mg tablet Take 800 mg by mouth three (3) times daily. 0    traZODone (DESYREL) 100 mg tablet Take 100 mg by mouth nightly.  sildenafil citrate (VIAGRA) 50 mg tablet Take 1 Tab by mouth as needed. 10 Tab 0       Patient Active Problem List    Diagnosis Date Noted    Spinal stenosis 10/03/2017    Pain 10/03/2017    Chronic left shoulder pain 05/27/2015    Chronic neck pain 11/13/2014    Neck pain on right side 05/09/2014    Erectile dysfunction 05/09/2014     Results for orders placed or performed during the hospital encounter of 09/28/17   CBC WITH AUTOMATED DIFF   Result Value Ref Range    WBC 6.6 4.0 - 11.0 1000/mm3    RBC 4.72 3.80 - 5.70 M/uL    HGB 13.9 12.4 - 17.2 gm/dl    HCT 41.3 37.0 - 50.0 %    MCV 87.5 80.0 - 98.0 fL    MCH 29.4 23.0 - 34.6 pg    MCHC 33.7 30.0 - 36.0 gm/dl    PLATELET 756 583 - 046 1000/mm3    MPV 10.5 (H) 6.0 - 10.0 fL    RDW-SD 41.8 35.1 - 43.9      NRBC 0 0 - 0      IMMATURE GRANULOCYTES 0.3 0.0 - 3.0 %    NEUTROPHILS 50.7 34 - 64 %    LYMPHOCYTES 36.0 28 - 48 %    MONOCYTES 9.5 1 - 13 %    EOSINOPHILS 2.1 0 - 5 %    BASOPHILS 1.4 0 - 3 %   TYPE, ABO & RH   Result Value Ref Range    ABO/Rh(D) A Rh Positive     ANTIBODY SCREEN   Result Value Ref Range    Antibody screen NEG       No visits with results within 3 Month(s) from this visit.    Latest known visit with results is:   Hospital Outpatient Visit on 09/28/2017   Component Date Value Ref Range Status    WBC 09/28/2017 6.6  4.0 - 11.0 1000/mm3 Final    RBC 09/28/2017 4.72  3.80 - 5.70 M/uL Final    HGB 09/28/2017 13.9  12.4 - 17.2 gm/dl Final    HCT 09/28/2017 41.3  37.0 - 50.0 % Final    MCV 09/28/2017 87.5  80.0 - 98.0 fL Final    MCH 09/28/2017 29.4  23.0 - 34.6 pg Final    MCHC 09/28/2017 33.7  30.0 - 36.0 gm/dl Final    PLATELET 23/18/5695 533  140 - 450 1000/mm3 Final    MPV 09/28/2017 10.5* 6.0 - 10.0 fL Final    RDW-SD 09/28/2017 41.8  35.1 - 43.9   Final    NRBC 09/28/2017 0  0 - 0   Final    IMMATURE GRANULOCYTES 09/28/2017 0.3  0.0 - 3.0 % Final    Comment: IG - Immature granulocytes (promyelocytes + myelocytes + metamyelocytes), their presence  indicates a left shift. An IG > 3% may predict positive blood cultures with 98% specificity.  (P<0.04) and 92% Positive Predictive Value (Jamila)1. Increased immature granulocytes  assist with the detection of infection and/or inflammation and may be present at an early stage  and are more sensitive and specific than band counts.       NEUTROPHILS 09/28/2017 50.7  34 - 64 % Final    LYMPHOCYTES 09/28/2017 36.0  28 - 48 % Final    MONOCYTES 09/28/2017 9.5  1 - 13 % Final    EOSINOPHILS 09/28/2017 2.1  0 - 5 % Final    BASOPHILS 09/28/2017 1.4  0 - 3 % Final    ABO/Rh(D) 09/28/2017 A Rh Positive    Final    Antibody screen 09/28/2017 NEG    Final          Follow-up Disposition: Not on File

## 2019-05-08 ENCOUNTER — OFFICE VISIT (OUTPATIENT)
Dept: FAMILY MEDICINE CLINIC | Age: 59
End: 2019-05-08

## 2019-05-08 VITALS
WEIGHT: 197 LBS | OXYGEN SATURATION: 98 % | BODY MASS INDEX: 29.18 KG/M2 | TEMPERATURE: 97.9 F | SYSTOLIC BLOOD PRESSURE: 142 MMHG | RESPIRATION RATE: 17 BRPM | DIASTOLIC BLOOD PRESSURE: 94 MMHG | HEIGHT: 69 IN | HEART RATE: 79 BPM

## 2019-05-08 DIAGNOSIS — M25.512 CHRONIC LEFT SHOULDER PAIN: Primary | ICD-10-CM

## 2019-05-08 DIAGNOSIS — G89.29 CHRONIC LEFT SHOULDER PAIN: Primary | ICD-10-CM

## 2019-05-08 DIAGNOSIS — G89.29 CHRONIC NECK PAIN: ICD-10-CM

## 2019-05-08 DIAGNOSIS — M54.2 CHRONIC NECK PAIN: ICD-10-CM

## 2019-05-08 DIAGNOSIS — M50.30 DEGENERATION OF INTERVERTEBRAL DISC OF CERVICAL REGION: ICD-10-CM

## 2019-05-08 DIAGNOSIS — R03.0 ELEVATED BLOOD PRESSURE READING: ICD-10-CM

## 2019-05-08 PROBLEM — R20.0 ARM NUMBNESS LEFT: Status: ACTIVE | Noted: 2019-05-08

## 2019-05-08 RX ORDER — TRAMADOL HYDROCHLORIDE 50 MG/1
50 TABLET ORAL
Qty: 20 TAB | Refills: 0 | Status: SHIPPED | OUTPATIENT
Start: 2019-05-08 | End: 2019-05-13

## 2019-05-08 NOTE — PROGRESS NOTES
José Miguel Liriano Diss Chief Complaint Patient presents with  Neck Pain Vitals:  
 05/08/19 1515 BP: (!) 142/94 Pulse: 79 Resp: 17 Temp: 97.9 °F (36.6 °C) TempSrc: Oral  
SpO2: 98% Weight: 197 lb (89.4 kg) Height: 5' 9\" (1.753 m) PainSc:   9  
 
 
 
HPI: Patient has neck pain due to degenerative disc disease of the cervical spine, he has been following his pain management according to  30 tablets of Percocet on April 11. As per patient he is scheduled to see Dr. Karla Rubio on May 16  for consultation after his he got his insurance Past Medical History:  
Diagnosis Date  Arm numbness left  Chronic pain   
 neck, back  DDD (degenerative disc disease)  Erectile dysfunction 5/9/2014  Stenosis Past Surgical History:  
Procedure Laterality Date  HX ORTHOPAEDIC Laser sx for Lower back for a bulging disc  NEUROLOGICAL PROCEDURE UNLISTED  1998  
 laser back surgery Social History Tobacco Use  Smoking status: Former Smoker  Smokeless tobacco: Never Used Substance Use Topics  Alcohol use: No  
 
 
Family History Problem Relation Age of Onset  COPD Mother  Cancer Father   
     brain met to whole body  No Known Problems Sister  No Known Problems Sister Review of Systems Constitutional: Negative for chills, fever, malaise/fatigue and weight loss. HENT: Negative for congestion, ear discharge, ear pain, hearing loss and nosebleeds. Eyes: Negative for blurred vision, double vision and discharge. Respiratory: Negative for cough. Cardiovascular: Negative for chest pain, palpitations, claudication and leg swelling. Gastrointestinal: Negative for abdominal pain, constipation, diarrhea, nausea and vomiting. Genitourinary: Negative for dysuria, frequency and urgency. Musculoskeletal: Positive for joint pain and neck pain. Negative for myalgias. Skin: Negative for itching and rash. Neurological: Positive for tingling and sensory change. Negative for dizziness, speech change, focal weakness, weakness and headaches. Physical Exam  
Constitutional: He is oriented to person, place, and time. He appears well-developed and well-nourished. No distress. HENT:  
Head: Normocephalic and atraumatic. Mouth/Throat: No oropharyngeal exudate. Eyes: Pupils are equal, round, and reactive to light. Conjunctivae are normal. Right eye exhibits no discharge. Left eye exhibits no discharge. No scleral icterus. Neck: Normal range of motion. Neck supple. No thyromegaly present. Cardiovascular: Normal rate, regular rhythm and normal heart sounds. Pulmonary/Chest: Effort normal and breath sounds normal. No respiratory distress. He has no rales. Abdominal: Soft. Musculoskeletal: Normal range of motion. He exhibits no edema, tenderness or deformity. Lymphadenopathy:  
  He has no cervical adenopathy. Neurological: He is alert and oriented to person, place, and time. No cranial nerve deficit. Coordination normal.  
Patient has tenderness over the left shoulder and weakness in the right upper extremity power is about 3+ Skin: Skin is warm and dry. No rash noted. He is not diaphoretic. No erythema. Psychiatric: He has a normal mood and affect. Judgment and thought content normal.  
Nursing note and vitals reviewed. Assessment and plan  
 
Plan of care has been discussed with the patient, he agrees to the plan and verbalized understanding. All his questions were answered More than 50% of the time spent in this visit was counseling the patient about  illness and treatment options 1. Chronic left shoulder pain 2. Chronic neck pain 
 
- traMADol (ULTRAM) 50 mg tablet; Take 1 Tab by mouth every eight (8) hours as needed for Pain for up to 5 days. Max Daily Amount: 150 mg. Dispense: 20 Tab; Refill: 0 
 
3. Degeneration of intervertebral disc of cervical region - traMADol (ULTRAM) 50 mg tablet; Take 1 Tab by mouth every eight (8) hours as needed for Pain for up to 5 days. Max Daily Amount: 150 mg. Dispense: 20 Tab; Refill: 0 
 
4. Elevated blood pressure reading Low-salt intake Follow-up in 3 weeks for nurse visit for blood pressure check Patient is due for annual physical to be scheduled Current Outpatient Medications Medication Sig Dispense Refill  traMADol (ULTRAM) 50 mg tablet Take 1 Tab by mouth every eight (8) hours as needed for Pain for up to 5 days. Max Daily Amount: 150 mg. 20 Tab 0  
 gabapentin (NEURONTIN) 800 mg tablet Take 800 mg by mouth three (3) times daily. 0  
 naloxone (NARCAN) 4 mg/actuation nasal spray Use 1 spray intranasally, then discard. Repeat with new spray every 2 min as needed for opioid overdose symptoms, alternating nostrils. 1 Each 1  
 traZODone (DESYREL) 100 mg tablet Take 100 mg by mouth nightly.  sildenafil citrate (VIAGRA) 50 mg tablet Take 1 Tab by mouth as needed. 10 Tab 0 Patient Active Problem List  
 Diagnosis Date Noted  Arm numbness left 05/08/2019  DDD (degenerative disc disease) 05/08/2019  Spinal stenosis 10/03/2017  Pain 10/03/2017  Chronic left shoulder pain 05/27/2015  Chronic neck pain 11/13/2014  Neck pain on right side 05/09/2014  Erectile dysfunction 05/09/2014 Results for orders placed or performed during the hospital encounter of 09/28/17 CBC WITH AUTOMATED DIFF Result Value Ref Range WBC 6.6 4.0 - 11.0 1000/mm3 RBC 4.72 3.80 - 5.70 M/uL  
 HGB 13.9 12.4 - 17.2 gm/dl HCT 41.3 37.0 - 50.0 % MCV 87.5 80.0 - 98.0 fL  
 MCH 29.4 23.0 - 34.6 pg  
 MCHC 33.7 30.0 - 36.0 gm/dl PLATELET 963 534 - 265 1000/mm3 MPV 10.5 (H) 6.0 - 10.0 fL  
 RDW-SD 41.8 35.1 - 43.9 NRBC 0 0 - 0 IMMATURE GRANULOCYTES 0.3 0.0 - 3.0 % NEUTROPHILS 50.7 34 - 64 % LYMPHOCYTES 36.0 28 - 48 % MONOCYTES 9.5 1 - 13 % EOSINOPHILS 2.1 0 - 5 % BASOPHILS 1.4 0 - 3 % TYPE, ABO & RH Result Value Ref Range ABO/Rh(D) A Rh Positive ANTIBODY SCREEN Result Value Ref Range Antibody screen NEG No visits with results within 3 Month(s) from this visit. Latest known visit with results is:  
Hospital Outpatient Visit on 09/28/2017 Component Date Value Ref Range Status  WBC 09/28/2017 6.6  4.0 - 11.0 1000/mm3 Final  
 RBC 09/28/2017 4.72  3.80 - 5.70 M/uL Final  
 HGB 09/28/2017 13.9  12.4 - 17.2 gm/dl Final  
 HCT 09/28/2017 41.3  37.0 - 50.0 % Final  
 MCV 09/28/2017 87.5  80.0 - 98.0 fL Final  
 MCH 09/28/2017 29.4  23.0 - 34.6 pg Final  
 MCHC 09/28/2017 33.7  30.0 - 36.0 gm/dl Final  
 PLATELET 08/41/7133 039  140 - 450 1000/mm3 Final  
 MPV 09/28/2017 10.5* 6.0 - 10.0 fL Final  
 RDW-SD 09/28/2017 41.8  35.1 - 43.9   Final  
 NRBC 09/28/2017 0  0 - 0   Final  
 IMMATURE GRANULOCYTES 09/28/2017 0.3  0.0 - 3.0 % Final  
 Comment: IG - Immature granulocytes (promyelocytes + myelocytes + metamyelocytes), their presence 
indicates a left shift. An IG > 3% may predict positive blood cultures with 98% specificity. 
(P<0.04) and 92% Positive Predictive Value (Sadiq-Erma)1. Increased immature granulocytes 
assist with the detection of infection and/or inflammation and may be present at an early stage 
and are more sensitive and specific than band counts.  NEUTROPHILS 09/28/2017 50.7  34 - 64 % Final  
 LYMPHOCYTES 09/28/2017 36.0  28 - 48 % Final  
 MONOCYTES 09/28/2017 9.5  1 - 13 % Final  
 EOSINOPHILS 09/28/2017 2.1  0 - 5 % Final  
 BASOPHILS 09/28/2017 1.4  0 - 3 % Final  
 ABO/Rh(D) 09/28/2017 A Rh Positive    Final  
 Antibody screen 09/28/2017 NEG    Final  
  
 
 
Follow-up and Dispositions · Return for schedule physical  and Nurse viist for BP check .

## 2019-05-24 ENCOUNTER — OFFICE VISIT (OUTPATIENT)
Dept: FAMILY MEDICINE CLINIC | Age: 59
End: 2019-05-24

## 2019-05-24 VITALS
HEART RATE: 70 BPM | RESPIRATION RATE: 17 BRPM | BODY MASS INDEX: 28.14 KG/M2 | DIASTOLIC BLOOD PRESSURE: 77 MMHG | OXYGEN SATURATION: 99 % | HEIGHT: 69 IN | TEMPERATURE: 98.4 F | SYSTOLIC BLOOD PRESSURE: 123 MMHG | WEIGHT: 190 LBS

## 2019-05-24 DIAGNOSIS — M51.36 DDD (DEGENERATIVE DISC DISEASE), LUMBAR: ICD-10-CM

## 2019-05-24 DIAGNOSIS — M48.02 SPINAL STENOSIS OF CERVICAL REGION: ICD-10-CM

## 2019-05-24 DIAGNOSIS — M54.2 NECK PAIN ON RIGHT SIDE: Primary | ICD-10-CM

## 2019-05-24 RX ORDER — TRAMADOL HYDROCHLORIDE 50 MG/1
50 TABLET ORAL
Qty: 10 TAB | Refills: 0 | Status: SHIPPED | OUTPATIENT
Start: 2019-05-24 | End: 2019-05-27

## 2019-05-24 NOTE — PROGRESS NOTES
José Miguel Guajardo     Chief Complaint   Patient presents with    Neck Pain    Shoulder Pain     Vitals:    05/24/19 0919   BP: 123/77   Pulse: 70   Resp: 17   Temp: 98.4 °F (36.9 °C)   TempSrc: Oral   SpO2: 99%   Weight: 190 lb (86.2 kg)   Height: 5' 9\" (1.753 m)   PainSc:   9         HPI:  He her for neck pain with radiculopathy ,he is following up with pain management, but for the last 3 visits since November patient is stating that I will make an appointment I have an appointment such date that that does not happen!!! That have been obstacles lack of insurance and some other issues, last prescription I gave the patient he was supposed to have an appointment on the 16th of this month, I have explained to the patient that I do not prescribe any narcotic on long-term, and today giving him tramadol it would be the last prescription for anything for pain ht is controlled substance     He verbalized understaffing     He has appointment  with [facility]  On May 29 . Past Medical History:   Diagnosis Date    Arm numbness left     Chronic pain     neck, back    DDD (degenerative disc disease)     Erectile dysfunction 5/9/2014    Stenosis      Past Surgical History:   Procedure Laterality Date    HX ORTHOPAEDIC      Laser sx for Lower back for a bulging disc    NEUROLOGICAL PROCEDURE UNLISTED  1998    laser back surgery     Social History     Tobacco Use    Smoking status: Former Smoker    Smokeless tobacco: Never Used   Substance Use Topics    Alcohol use: No       Family History   Problem Relation Age of Onset    COPD Mother    [facility] Cancer Father         brain met to whole body    No Known Problems Sister     No Known Problems Sister        Review of Systems   Constitutional: Negative for chills, fever, malaise/fatigue and weight loss. HENT: Negative for congestion, ear discharge, ear pain, hearing loss, nosebleeds and sinus pain.     Eyes: Negative for blurred vision, double vision and discharge. Respiratory: Negative for cough. Cardiovascular: Negative for chest pain, palpitations, claudication and leg swelling. Gastrointestinal: Negative for abdominal pain, constipation, diarrhea, nausea and vomiting. Genitourinary: Negative for dysuria, frequency and urgency. Musculoskeletal: Positive for neck pain. Negative for back pain and myalgias. Skin: Negative for itching and rash. Neurological: Positive for tingling. Negative for dizziness, sensory change, speech change, focal weakness, weakness and headaches. Psychiatric/Behavioral: Negative for depression and suicidal ideas. Physical Exam   Constitutional: He is oriented to person, place, and time. He appears well-developed and well-nourished. No distress. HENT:   Head: Normocephalic and atraumatic. Eyes: Pupils are equal, round, and reactive to light. Conjunctivae are normal.   Neck: Normal range of motion. Neck supple. Cardiovascular: Normal rate, regular rhythm and normal heart sounds. Pulmonary/Chest: Effort normal.   Abdominal: Soft. Bowel sounds are normal.   Musculoskeletal: Normal range of motion. He exhibits no deformity. Neurological: He is alert and oriented to person, place, and time. No cranial nerve deficit. Skin: Skin is warm. He is not diaphoretic. No erythema. Psychiatric: He has a normal mood and affect. Judgment and thought content normal.   Nursing note and vitals reviewed. Assessment and plan     Plan of care has been discussed with the patient, he agrees to the plan and verbalized understanding. All his questions were answered  More than 50% of the time spent in this visit was counseling the patient about  illness and treatment options         1. Neck pain on right side    - traMADol (ULTRAM) 50 mg tablet; Take 1 Tab by mouth every six (6) hours as needed for Pain for up to 3 days. Max Daily Amount: 200 mg. Dispense: 10 Tab; Refill: 0    2.  Spinal stenosis of cervical region    - traMADol (ULTRAM) 50 mg tablet; Take 1 Tab by mouth every six (6) hours as needed for Pain for up to 3 days. Max Daily Amount: 200 mg. Dispense: 10 Tab; Refill: 0    3. DDD (degenerative disc disease), lumbar    - traMADol (ULTRAM) 50 mg tablet; Take 1 Tab by mouth every six (6) hours as needed for Pain for up to 3 days. Max Daily Amount: 200 mg. Dispense: 10 Tab; Refill: 0    Current Outpatient Medications   Medication Sig Dispense Refill    traMADol (ULTRAM) 50 mg tablet Take 1 Tab by mouth every six (6) hours as needed for Pain for up to 3 days. Max Daily Amount: 200 mg. 10 Tab 0    gabapentin (NEURONTIN) 800 mg tablet Take 800 mg by mouth three (3) times daily. 0    sildenafil citrate (VIAGRA) 50 mg tablet Take 1 Tab by mouth as needed. 10 Tab 0    naloxone (NARCAN) 4 mg/actuation nasal spray Use 1 spray intranasally, then discard. Repeat with new spray every 2 min as needed for opioid overdose symptoms, alternating nostrils. 1 Each 1    traZODone (DESYREL) 100 mg tablet Take 100 mg by mouth nightly.          Patient Active Problem List    Diagnosis Date Noted    Arm numbness left 05/08/2019    DDD (degenerative disc disease), lumbar 05/08/2019    Spinal stenosis 10/03/2017    Pain 10/03/2017    Chronic left shoulder pain 05/27/2015    Chronic neck pain 11/13/2014    Neck pain on right side 05/09/2014    Erectile dysfunction 05/09/2014     Results for orders placed or performed during the hospital encounter of 09/28/17   CBC WITH AUTOMATED DIFF   Result Value Ref Range    WBC 6.6 4.0 - 11.0 1000/mm3    RBC 4.72 3.80 - 5.70 M/uL    HGB 13.9 12.4 - 17.2 gm/dl    HCT 41.3 37.0 - 50.0 %    MCV 87.5 80.0 - 98.0 fL    MCH 29.4 23.0 - 34.6 pg    MCHC 33.7 30.0 - 36.0 gm/dl    PLATELET 129 226 - 036 1000/mm3    MPV 10.5 (H) 6.0 - 10.0 fL    RDW-SD 41.8 35.1 - 43.9      NRBC 0 0 - 0      IMMATURE GRANULOCYTES 0.3 0.0 - 3.0 %    NEUTROPHILS 50.7 34 - 64 %    LYMPHOCYTES 36.0 28 - 48 %    MONOCYTES 9.5 1 - 13 %    EOSINOPHILS 2.1 0 - 5 %    BASOPHILS 1.4 0 - 3 %   TYPE, ABO & RH   Result Value Ref Range    ABO/Rh(D) A Rh Positive     ANTIBODY SCREEN   Result Value Ref Range    Antibody screen NEG       No visits with results within 3 Month(s) from this visit. Latest known visit with results is:   Hospital Outpatient Visit on 09/28/2017   Component Date Value Ref Range Status    WBC 09/28/2017 6.6  4.0 - 11.0 1000/mm3 Final    RBC 09/28/2017 4.72  3.80 - 5.70 M/uL Final    HGB 09/28/2017 13.9  12.4 - 17.2 gm/dl Final    HCT 09/28/2017 41.3  37.0 - 50.0 % Final    MCV 09/28/2017 87.5  80.0 - 98.0 fL Final    MCH 09/28/2017 29.4  23.0 - 34.6 pg Final    MCHC 09/28/2017 33.7  30.0 - 36.0 gm/dl Final    PLATELET 75/50/1170 781  140 - 450 1000/mm3 Final    MPV 09/28/2017 10.5* 6.0 - 10.0 fL Final    RDW-SD 09/28/2017 41.8  35.1 - 43.9   Final    NRBC 09/28/2017 0  0 - 0   Final    IMMATURE GRANULOCYTES 09/28/2017 0.3  0.0 - 3.0 % Final    Comment: IG - Immature granulocytes (promyelocytes + myelocytes + metamyelocytes), their presence  indicates a left shift. An IG > 3% may predict positive blood cultures with 98% specificity.  (P<0.04) and 92% Positive Predictive Value (Sadiq-Erma)1. Increased immature granulocytes  assist with the detection of infection and/or inflammation and may be present at an early stage  and are more sensitive and specific than band counts.       NEUTROPHILS 09/28/2017 50.7  34 - 64 % Final    LYMPHOCYTES 09/28/2017 36.0  28 - 48 % Final    MONOCYTES 09/28/2017 9.5  1 - 13 % Final    EOSINOPHILS 09/28/2017 2.1  0 - 5 % Final    BASOPHILS 09/28/2017 1.4  0 - 3 % Final    ABO/Rh(D) 09/28/2017 A Rh Positive    Final    Antibody screen 09/28/2017 NEG    Final          Follow-up and Dispositions    · Return as needed , for Need to scheule physical .

## 2019-05-24 NOTE — PROGRESS NOTES
Sheldon Marcano is a 66 y.o. male presents to office for neck and shoulder pain    Medication list has been reviewed with Sheldon Marcano and updated as of today's date     Health Maintenance items with a due date reviewed with patient:  Health Maintenance Due   Topic Date Due    DTaP/Tdap/Td series (1 - Tdap) 08/07/1983    Shingrix Vaccine Age 50> (1 of 2) 08/07/2012